# Patient Record
Sex: MALE | Race: WHITE | NOT HISPANIC OR LATINO | Employment: OTHER | URBAN - NONMETROPOLITAN AREA
[De-identification: names, ages, dates, MRNs, and addresses within clinical notes are randomized per-mention and may not be internally consistent; named-entity substitution may affect disease eponyms.]

---

## 2021-03-31 ENCOUNTER — OFFICE VISIT (OUTPATIENT)
Dept: FAMILY MEDICINE CLINIC | Facility: CLINIC | Age: 66
End: 2021-03-31

## 2021-03-31 VITALS
HEART RATE: 66 BPM | HEIGHT: 71 IN | RESPIRATION RATE: 18 BRPM | SYSTOLIC BLOOD PRESSURE: 128 MMHG | OXYGEN SATURATION: 97 % | DIASTOLIC BLOOD PRESSURE: 80 MMHG | TEMPERATURE: 98.5 F | WEIGHT: 208.4 LBS | BODY MASS INDEX: 29.18 KG/M2

## 2021-03-31 DIAGNOSIS — B00.9 HSV INFECTION: ICD-10-CM

## 2021-03-31 DIAGNOSIS — K21.9 GASTROESOPHAGEAL REFLUX DISEASE, UNSPECIFIED WHETHER ESOPHAGITIS PRESENT: ICD-10-CM

## 2021-03-31 DIAGNOSIS — Z76.89 ENCOUNTER TO ESTABLISH CARE: Primary | ICD-10-CM

## 2021-03-31 DIAGNOSIS — E78.2 MIXED HYPERLIPIDEMIA: ICD-10-CM

## 2021-03-31 DIAGNOSIS — I10 ESSENTIAL HYPERTENSION: ICD-10-CM

## 2021-03-31 DIAGNOSIS — Z12.11 COLON CANCER SCREENING: ICD-10-CM

## 2021-03-31 RX ORDER — ASPIRIN 81 MG/1
81 TABLET, CHEWABLE ORAL DAILY
COMMUNITY
End: 2021-12-15

## 2021-03-31 RX ORDER — OMEPRAZOLE 20 MG/1
20 CAPSULE, DELAYED RELEASE ORAL DAILY
Qty: 90 CAPSULE | Refills: 1 | Status: SHIPPED | OUTPATIENT
Start: 2021-03-31 | End: 2021-12-08 | Stop reason: SDUPTHER

## 2021-03-31 RX ORDER — UBIQUINOL 100 MG
CAPSULE ORAL
COMMUNITY
End: 2022-03-23

## 2021-03-31 RX ORDER — OMEPRAZOLE 10 MG/1
20 CAPSULE, DELAYED RELEASE ORAL DAILY
COMMUNITY
End: 2021-03-31 | Stop reason: SDUPTHER

## 2021-03-31 RX ORDER — UBIDECARENONE 10 MG
200 CAPSULE ORAL DAILY
COMMUNITY
End: 2022-03-23

## 2021-03-31 RX ORDER — VALACYCLOVIR HYDROCHLORIDE 1 G/1
1000 TABLET, FILM COATED ORAL 2 TIMES DAILY PRN
Qty: 30 TABLET | Refills: 2 | Status: SHIPPED | OUTPATIENT
Start: 2021-03-31 | End: 2022-03-31

## 2021-03-31 RX ORDER — MULTIVIT WITH IRON,MINERALS
LIQUID (ML) ORAL DAILY
COMMUNITY
End: 2022-03-23

## 2021-03-31 RX ORDER — BENAZEPRIL HYDROCHLORIDE 10 MG/1
10 TABLET ORAL DAILY
COMMUNITY
End: 2021-03-31 | Stop reason: SDUPTHER

## 2021-03-31 RX ORDER — VALACYCLOVIR HYDROCHLORIDE 1 G/1
1000 TABLET, FILM COATED ORAL 2 TIMES DAILY PRN
COMMUNITY
End: 2021-03-31 | Stop reason: SDUPTHER

## 2021-03-31 RX ORDER — BENAZEPRIL HYDROCHLORIDE 10 MG/1
10 TABLET ORAL DAILY
Qty: 90 TABLET | Refills: 1 | Status: SHIPPED | OUTPATIENT
Start: 2021-03-31

## 2021-03-31 NOTE — PROGRESS NOTES
Assessment/Plan:     Diagnoses and all orders for this visit:    Encounter to establish care    Mixed hyperlipidemia  -     Lipid panel; Future    Essential hypertension  -     benazepril (LOTENSIN) 10 mg tablet; Take 1 tablet (10 mg total) by mouth daily  -     CBC and differential; Future  -     Comprehensive metabolic panel; Future  -     TSH, 3rd generation with Free T4 reflex; Future    Gastroesophageal reflux disease, unspecified whether esophagitis present  -     omeprazole (PriLOSEC) 20 mg delayed release capsule; Take 1 capsule (20 mg total) by mouth daily    Colon cancer screening  -     Ambulatory referral to Gastroenterology; Future    HSV infection  -     valACYclovir (VALTREX) 1,000 mg tablet; Take 1 tablet (1,000 mg total) by mouth 2 (two) times a day as needed (HSV Flare) for up to 30 doses    Other orders  -     Discontinue: omeprazole (PriLOSEC) 10 mg delayed release capsule; Take 20 mg by mouth daily  -     Discontinue: benazepril (LOTENSIN) 10 mg tablet; Take 10 mg by mouth daily  -     Multiple Vitamins-Minerals (multivitamin with iron-minerals) liquid; Take by mouth daily  -     Barnard 550 MG CAPS; Take by mouth  -     aspirin 81 mg chewable tablet; Chew 81 mg daily  -     Glucosamine 750 MG TABS; Take by mouth  -     St Johns Wort 150 MG TABS; Take by mouth  -     Discontinue: valACYclovir (VALTREX) 1,000 mg tablet; Take 1,000 mg by mouth 2 (two) times a day as needed  -     patient supplied medication; MEDICAL MARIJUANA  -     Coenzyme Q10 10 MG capsule; Take 200 mg by mouth daily        - Continue current medications  - Routine labs ordered  - Referral provided to GI for colon cancer screening, last done 2011    Return in about 3 months (around 6/30/2021) for Next scheduled follow up  Subjective:        Patient ID: Zhang Carranza is a 63 y o  male      Chief Complaint   Patient presents with   Lucía Guerrero is a 63year old male with history of HTN, HLD, and GERD, presenting to establish care  HTN is currently managed with benazepril 10 mg daily  /80 today  Patient denies chest pain, palpitations, LE edema  HLD, not currently on medication  Last lipid panel from 2019 with total cholesterol 266, , trig 140  GERD well controlled with omeprazole 20 mg daily  Patient's last colonoscopy 2011  Reports a family history of colon cancer in an aunt and uncle  Today patient is requesting a refill for valtrex which he takes PRN for HSV flares  He reports good control with this medication  The following portions of the patient's history were reviewed and updated as appropriate: allergies, current medications, past family history, past medical history, past social history, past surgical history and problem list     Patient Active Problem List   Diagnosis    Essential hypertension    Mixed hyperlipidemia    Gastroesophageal reflux disease       Current Outpatient Medications   Medication Sig Dispense Refill    aspirin 81 mg chewable tablet Chew 81 mg daily      benazepril (LOTENSIN) 10 mg tablet Take 1 tablet (10 mg total) by mouth daily 90 tablet 1    Glucosamine 750 MG TABS Take by mouth      Prospect 550 MG CAPS Take by mouth      Multiple Vitamins-Minerals (multivitamin with iron-minerals) liquid Take by mouth daily      omeprazole (PriLOSEC) 20 mg delayed release capsule Take 1 capsule (20 mg total) by mouth daily 90 capsule 1    patient supplied medication MEDICAL MARIJUANA      St Johns Wort 150 MG TABS Take by mouth      valACYclovir (VALTREX) 1,000 mg tablet Take 1 tablet (1,000 mg total) by mouth 2 (two) times a day as needed (HSV Flare) for up to 30 doses 30 tablet 2    Coenzyme Q10 10 MG capsule Take 200 mg by mouth daily       No current facility-administered medications for this visit           Past Medical History:   Diagnosis Date    Arthritis     GERD (gastroesophageal reflux disease)     Hiatal hernia     Jungle fever 1986 SHAKES, VOMITING APPROX EVERY 6 WEEKS    Shingles         Past Surgical History:   Procedure Laterality Date    VASECTOMY      WISDOM TOOTH EXTRACTION Bilateral         Social History     Socioeconomic History    Marital status: /Civil Union     Spouse name: Not on file    Number of children: Not on file    Years of education: Not on file    Highest education level: Not on file   Occupational History    Not on file   Social Needs    Financial resource strain: Not on file    Food insecurity     Worry: Not on file     Inability: Not on file   [employer] needs     Medical: Not on file     Non-medical: Not on file   Tobacco Use    Smoking status: Never Smoker    Smokeless tobacco: Former User   Substance and Sexual Activity    Alcohol use: Not Currently     Comment: social    Drug use: Never     Comment: medical marijuana    Sexual activity: Not Currently   Lifestyle    Physical activity     Days per week: Not on file     Minutes per session: Not on file    Stress: Not on file   Relationships    Social connections     Talks on phone: Not on file     Gets together: Not on file     Attends Confucianist service: Not on file     Active member of club or organization: Not on file     Attends meetings of clubs or organizations: Not on file     Relationship status: Not on file    Intimate partner violence     Fear of current or ex partner: Not on file     Emotionally abused: Not on file     Physically abused: Not on file     Forced sexual activity: Not on file   Other Topics Concern    Not on file   Social History Narrative    Not on file        Review of Systems   Constitutional: Negative for chills, diaphoresis and fever  HENT: Negative for congestion, ear pain, postnasal drip, rhinorrhea, sinus pressure and sore throat  Eyes: Negative for photophobia, pain, discharge, redness, itching and visual disturbance     Respiratory: Negative for cough, chest tightness, shortness of breath and wheezing  Cardiovascular: Negative for chest pain, palpitations and leg swelling  Gastrointestinal: Negative for abdominal pain, blood in stool, constipation, diarrhea, nausea and vomiting  Skin: Negative for rash and wound  Neurological: Negative for dizziness, syncope, weakness, light-headedness and headaches  Psychiatric/Behavioral: Negative for decreased concentration, dysphoric mood, self-injury, sleep disturbance and suicidal ideas  The patient is not nervous/anxious  Objective:      /80 (BP Location: Right arm, Patient Position: Sitting, Cuff Size: Large)   Pulse 66   Temp 98 5 °F (36 9 °C) (Temporal)   Resp 18   Ht 5' 11" (1 803 m)   Wt 94 5 kg (208 lb 6 4 oz)   SpO2 97%   BMI 29 07 kg/m²          Physical Exam  Vitals signs and nursing note reviewed  Constitutional:       General: He is not in acute distress  Appearance: Normal appearance  HENT:      Head: Normocephalic and atraumatic  Eyes:      Extraocular Movements: Extraocular movements intact  Conjunctiva/sclera: Conjunctivae normal       Pupils: Pupils are equal, round, and reactive to light  Neck:      Musculoskeletal: Normal range of motion and neck supple  Cardiovascular:      Rate and Rhythm: Normal rate and regular rhythm  Heart sounds: Normal heart sounds  No murmur  Pulmonary:      Effort: Pulmonary effort is normal  No respiratory distress  Breath sounds: Normal breath sounds  No wheezing  Musculoskeletal: Normal range of motion  Right lower leg: No edema  Left lower leg: No edema  Skin:     General: Skin is warm and dry  Neurological:      General: No focal deficit present  Mental Status: He is alert and oriented to person, place, and time  Cranial Nerves: No cranial nerve deficit  Motor: No weakness     Psychiatric:         Mood and Affect: Mood normal          Behavior: Behavior normal

## 2021-09-15 ENCOUNTER — APPOINTMENT (OUTPATIENT)
Dept: LAB | Facility: HOSPITAL | Age: 66
End: 2021-09-15

## 2021-09-15 DIAGNOSIS — E78.2 MIXED HYPERLIPIDEMIA: ICD-10-CM

## 2021-09-15 DIAGNOSIS — I10 ESSENTIAL HYPERTENSION: ICD-10-CM

## 2021-09-15 LAB
ALBUMIN SERPL BCP-MCNC: 4 G/DL (ref 3.5–5)
ALP SERPL-CCNC: 79 U/L (ref 46–116)
ALT SERPL W P-5'-P-CCNC: 62 U/L (ref 12–78)
ANION GAP SERPL CALCULATED.3IONS-SCNC: 10 MMOL/L (ref 4–13)
AST SERPL W P-5'-P-CCNC: 34 U/L (ref 5–45)
BASOPHILS # BLD AUTO: 0.07 THOUSANDS/ΜL (ref 0–0.1)
BASOPHILS NFR BLD AUTO: 1 % (ref 0–1)
BILIRUB SERPL-MCNC: 0.57 MG/DL (ref 0.2–1)
BUN SERPL-MCNC: 17 MG/DL (ref 5–25)
CALCIUM SERPL-MCNC: 9.3 MG/DL (ref 8.3–10.1)
CHLORIDE SERPL-SCNC: 105 MMOL/L (ref 100–108)
CHOLEST SERPL-MCNC: 287 MG/DL (ref 50–200)
CO2 SERPL-SCNC: 27 MMOL/L (ref 21–32)
CREAT SERPL-MCNC: 1 MG/DL (ref 0.6–1.3)
EOSINOPHIL # BLD AUTO: 0.22 THOUSAND/ΜL (ref 0–0.61)
EOSINOPHIL NFR BLD AUTO: 4 % (ref 0–6)
ERYTHROCYTE [DISTWIDTH] IN BLOOD BY AUTOMATED COUNT: 12.1 % (ref 11.6–15.1)
GFR SERPL CREATININE-BSD FRML MDRD: 79 ML/MIN/1.73SQ M
GLUCOSE P FAST SERPL-MCNC: 128 MG/DL (ref 65–99)
HCT VFR BLD AUTO: 42.6 % (ref 36.5–49.3)
HDLC SERPL-MCNC: 40 MG/DL
HGB BLD-MCNC: 14.3 G/DL (ref 12–17)
IMM GRANULOCYTES # BLD AUTO: 0.01 THOUSAND/UL (ref 0–0.2)
IMM GRANULOCYTES NFR BLD AUTO: 0 % (ref 0–2)
LDLC SERPL CALC-MCNC: 204 MG/DL (ref 0–100)
LYMPHOCYTES # BLD AUTO: 2.52 THOUSANDS/ΜL (ref 0.6–4.47)
LYMPHOCYTES NFR BLD AUTO: 42 % (ref 14–44)
MCH RBC QN AUTO: 29.3 PG (ref 26.8–34.3)
MCHC RBC AUTO-ENTMCNC: 33.6 G/DL (ref 31.4–37.4)
MCV RBC AUTO: 87 FL (ref 82–98)
MONOCYTES # BLD AUTO: 0.46 THOUSAND/ΜL (ref 0.17–1.22)
MONOCYTES NFR BLD AUTO: 8 % (ref 4–12)
NEUTROPHILS # BLD AUTO: 2.77 THOUSANDS/ΜL (ref 1.85–7.62)
NEUTS SEG NFR BLD AUTO: 45 % (ref 43–75)
NONHDLC SERPL-MCNC: 247 MG/DL
NRBC BLD AUTO-RTO: 0 /100 WBCS
PLATELET # BLD AUTO: 291 THOUSANDS/UL (ref 149–390)
PMV BLD AUTO: 10.5 FL (ref 8.9–12.7)
POTASSIUM SERPL-SCNC: 4.3 MMOL/L (ref 3.5–5.3)
PROT SERPL-MCNC: 8 G/DL (ref 6.4–8.2)
RBC # BLD AUTO: 4.88 MILLION/UL (ref 3.88–5.62)
SODIUM SERPL-SCNC: 142 MMOL/L (ref 136–145)
TRIGL SERPL-MCNC: 213 MG/DL
TSH SERPL DL<=0.05 MIU/L-ACNC: 2.13 UIU/ML (ref 0.36–3.74)
WBC # BLD AUTO: 6.05 THOUSAND/UL (ref 4.31–10.16)

## 2021-09-15 PROCEDURE — 80053 COMPREHEN METABOLIC PANEL: CPT

## 2021-09-15 PROCEDURE — 36415 COLL VENOUS BLD VENIPUNCTURE: CPT

## 2021-09-15 PROCEDURE — 85025 COMPLETE CBC W/AUTO DIFF WBC: CPT

## 2021-09-15 PROCEDURE — 84443 ASSAY THYROID STIM HORMONE: CPT

## 2021-09-15 PROCEDURE — 80061 LIPID PANEL: CPT

## 2021-09-16 ENCOUNTER — TELEPHONE (OUTPATIENT)
Dept: FAMILY MEDICINE CLINIC | Facility: CLINIC | Age: 66
End: 2021-09-16

## 2021-09-16 NOTE — TELEPHONE ENCOUNTER
Spoke with patients wife  Patient is scheduled for 9/24,    ----- Message from Brandi Singh PA-C sent at 9/16/2021  8:14 AM EDT -----  Please schedule patient for visit to review blood work  Thanks!

## 2021-09-17 ENCOUNTER — OFFICE VISIT (OUTPATIENT)
Dept: FAMILY MEDICINE CLINIC | Facility: CLINIC | Age: 66
End: 2021-09-17
Payer: OTHER GOVERNMENT

## 2021-09-17 VITALS
HEIGHT: 71 IN | HEART RATE: 60 BPM | RESPIRATION RATE: 16 BRPM | BODY MASS INDEX: 30.52 KG/M2 | DIASTOLIC BLOOD PRESSURE: 76 MMHG | OXYGEN SATURATION: 98 % | TEMPERATURE: 97.9 F | SYSTOLIC BLOOD PRESSURE: 132 MMHG | WEIGHT: 218 LBS

## 2021-09-17 DIAGNOSIS — R73.9 ELEVATED BLOOD SUGAR LEVEL: ICD-10-CM

## 2021-09-17 DIAGNOSIS — E78.2 MIXED HYPERLIPIDEMIA: Primary | ICD-10-CM

## 2021-09-17 LAB — SL AMB POCT HEMOGLOBIN AIC: 5.8 (ref ?–6.5)

## 2021-09-17 PROCEDURE — 99213 OFFICE O/P EST LOW 20 MIN: CPT | Performed by: FAMILY MEDICINE

## 2021-09-17 PROCEDURE — 85018 HEMOGLOBIN: CPT | Performed by: FAMILY MEDICINE

## 2021-09-17 RX ORDER — ATORVASTATIN CALCIUM 40 MG/1
40 TABLET, FILM COATED ORAL DAILY
Qty: 30 TABLET | Refills: 1 | Status: SHIPPED | OUTPATIENT
Start: 2021-09-17

## 2021-09-17 NOTE — PROGRESS NOTES
Assessment/Plan:    No problem-specific Assessment & Plan notes found for this encounter  Diagnoses and all orders for this visit:    Mixed hyperlipidemia  -     atorvastatin (LIPITOR) 40 mg tablet; Take 1 tablet (40 mg total) by mouth daily  -     Hepatic function panel; Future    Elevated blood sugar level  -     POCT hemoglobin A1c    Other orders  -     Trolamine Salicylate (BLUE-EMU HEMP EX); Apply topically          -reviewed heart healthy diet with patient  will start high intensity statin therapy  Check LFTs 1 month  -RTC 3 months or sooner if concerns arise    Subjective:      Patient ID: Rain Medina is a 61 y o  male with a PMH of HTN, GERD, and HLD presents for follow up to review blood work  His FBS is mildly elevated at 128  He notes increased thirst and frequent urination, however, this has been present for 40+ years  He has no fatigue, nausea, vomiting, abdominal pain, changes in vision  His in office A1C is 5 8%    His LDL cholesterol is very elevated at 204  He is not on a cholesterol medication  He is concerned as his family has a history of esophageal cancer (brother) and colon cancer (uncle)  He is scheduled wit GI in November  His GERD is managed with prilosec  His BP is at goal on benazepril  Denies CP, changes in vision, headaches, dizziness  HPI    The following portions of the patient's history were reviewed and updated as appropriate: allergies, current medications, past family history, past medical history, past social history, past surgical history and problem list     Review of Systems   Constitutional: Negative for activity change and appetite change  HENT: Negative  Eyes: Negative for visual disturbance  Respiratory: Negative for cough, chest tightness, shortness of breath and wheezing  Cardiovascular: Negative for chest pain, palpitations and leg swelling     Gastrointestinal: Negative for abdominal distention, constipation, diarrhea, nausea and vomiting  Endocrine: Positive for polyuria  Negative for cold intolerance, heat intolerance, polydipsia and polyphagia  Neurological: Negative for dizziness and headaches  Objective:      /76   Pulse 60   Temp 97 9 °F (36 6 °C)   Resp 16   Ht 5' 11" (1 803 m)   Wt 98 9 kg (218 lb)   SpO2 98%   BMI 30 40 kg/m²          Physical Exam  Vitals reviewed  Constitutional:       General: He is not in acute distress  Appearance: Normal appearance  He is well-developed  He is obese  HENT:      Head: Normocephalic and atraumatic  Eyes:      Conjunctiva/sclera: Conjunctivae normal       Pupils: Pupils are equal, round, and reactive to light  Neck:      Thyroid: No thyromegaly  Cardiovascular:      Rate and Rhythm: Normal rate and regular rhythm  Pulses: Normal pulses  Heart sounds: Normal heart sounds  No murmur heard  Pulmonary:      Effort: Pulmonary effort is normal  No respiratory distress  Breath sounds: Normal breath sounds  No wheezing  Abdominal:      General: Bowel sounds are normal  There is no distension  Palpations: Abdomen is soft  There is no mass  Tenderness: There is no abdominal tenderness  Hernia: No hernia is present  Musculoskeletal:         General: Normal range of motion  Cervical back: Normal range of motion and neck supple  Right lower leg: No edema  Left lower leg: No edema  Skin:     General: Skin is warm and dry  Capillary Refill: Capillary refill takes less than 2 seconds  Neurological:      Mental Status: He is alert and oriented to person, place, and time  Mental status is at baseline  Psychiatric:         Mood and Affect: Mood normal          Behavior: Behavior normal          Thought Content: Thought content normal          Judgment: Judgment normal            BMI Counseling: Body mass index is 30 4 kg/m²   The BMI is above normal  Nutrition recommendations include reducing portion sizes, decreasing overall calorie intake, 3-5 servings of fruits/vegetables daily, reducing fast food intake, consuming healthier snacks, decreasing soda and/or juice intake, moderation in carbohydrate intake, increasing intake of lean protein, reducing intake of saturated fat and trans fat and reducing intake of cholesterol  Exercise recommendations include moderate aerobic physical activity for 150 minutes/week, joining a gym and strength training exercises

## 2021-09-17 NOTE — PATIENT INSTRUCTIONS

## 2021-11-19 ENCOUNTER — APPOINTMENT (OUTPATIENT)
Dept: LAB | Facility: MEDICAL CENTER | Age: 66
End: 2021-11-19
Payer: OTHER GOVERNMENT

## 2021-11-19 DIAGNOSIS — E78.2 MIXED HYPERLIPIDEMIA: ICD-10-CM

## 2021-11-19 LAB
ALBUMIN SERPL BCP-MCNC: 3.6 G/DL (ref 3.5–5)
ALP SERPL-CCNC: 70 U/L (ref 46–116)
ALT SERPL W P-5'-P-CCNC: 44 U/L (ref 12–78)
AST SERPL W P-5'-P-CCNC: 25 U/L (ref 5–45)
BILIRUB DIRECT SERPL-MCNC: 0.08 MG/DL (ref 0–0.2)
BILIRUB SERPL-MCNC: 0.43 MG/DL (ref 0.2–1)
PROT SERPL-MCNC: 7.2 G/DL (ref 6.4–8.2)

## 2021-11-19 PROCEDURE — 80076 HEPATIC FUNCTION PANEL: CPT

## 2021-11-19 PROCEDURE — 36415 COLL VENOUS BLD VENIPUNCTURE: CPT

## 2021-11-22 ENCOUNTER — TELEPHONE (OUTPATIENT)
Dept: FAMILY MEDICINE CLINIC | Facility: CLINIC | Age: 66
End: 2021-11-22

## 2021-11-29 ENCOUNTER — CONSULT (OUTPATIENT)
Dept: GASTROENTEROLOGY | Facility: CLINIC | Age: 66
End: 2021-11-29
Payer: OTHER GOVERNMENT

## 2021-11-29 VITALS
WEIGHT: 219.2 LBS | TEMPERATURE: 98.5 F | HEIGHT: 71 IN | SYSTOLIC BLOOD PRESSURE: 149 MMHG | HEART RATE: 57 BPM | BODY MASS INDEX: 30.69 KG/M2 | DIASTOLIC BLOOD PRESSURE: 76 MMHG

## 2021-11-29 DIAGNOSIS — Z12.11 COLON CANCER SCREENING: ICD-10-CM

## 2021-11-29 DIAGNOSIS — K21.9 GASTROESOPHAGEAL REFLUX DISEASE WITHOUT ESOPHAGITIS: Primary | ICD-10-CM

## 2021-11-29 PROCEDURE — 99244 OFF/OP CNSLTJ NEW/EST MOD 40: CPT | Performed by: INTERNAL MEDICINE

## 2021-12-06 ENCOUNTER — OFFICE VISIT (OUTPATIENT)
Dept: URGENT CARE | Facility: CLINIC | Age: 66
End: 2021-12-06
Payer: OTHER GOVERNMENT

## 2021-12-06 VITALS
OXYGEN SATURATION: 97 % | DIASTOLIC BLOOD PRESSURE: 83 MMHG | BODY MASS INDEX: 30.1 KG/M2 | SYSTOLIC BLOOD PRESSURE: 168 MMHG | HEIGHT: 71 IN | HEART RATE: 68 BPM | WEIGHT: 215 LBS | TEMPERATURE: 98.2 F

## 2021-12-06 DIAGNOSIS — R50.9 FEVER, UNSPECIFIED FEVER CAUSE: ICD-10-CM

## 2021-12-06 DIAGNOSIS — Z11.59 SCREENING FOR VIRAL DISEASE: ICD-10-CM

## 2021-12-06 DIAGNOSIS — J02.9 ACUTE PHARYNGITIS, UNSPECIFIED ETIOLOGY: Primary | ICD-10-CM

## 2021-12-06 LAB
FLUAV RNA RESP QL NAA+PROBE: NEGATIVE
FLUBV RNA RESP QL NAA+PROBE: NEGATIVE
RSV RNA RESP QL NAA+PROBE: NEGATIVE
SARS-COV-2 RNA RESP QL NAA+PROBE: NEGATIVE

## 2021-12-06 PROCEDURE — 99202 OFFICE O/P NEW SF 15 MIN: CPT | Performed by: PHYSICIAN ASSISTANT

## 2021-12-06 PROCEDURE — 0241U HB NFCT DS VIR RESP RNA 4 TRGT: CPT | Performed by: PHYSICIAN ASSISTANT

## 2021-12-06 RX ORDER — AMOXICILLIN 875 MG/1
875 TABLET, COATED ORAL 2 TIMES DAILY
Qty: 20 TABLET | Refills: 0
Start: 2021-12-06 | End: 2021-12-16

## 2021-12-08 DIAGNOSIS — K21.9 GASTROESOPHAGEAL REFLUX DISEASE, UNSPECIFIED WHETHER ESOPHAGITIS PRESENT: ICD-10-CM

## 2021-12-08 RX ORDER — OMEPRAZOLE 20 MG/1
20 CAPSULE, DELAYED RELEASE ORAL DAILY
Qty: 90 CAPSULE | Refills: 1 | Status: SHIPPED | OUTPATIENT
Start: 2021-12-08 | End: 2022-06-24 | Stop reason: SDUPTHER

## 2021-12-15 ENCOUNTER — OFFICE VISIT (OUTPATIENT)
Dept: FAMILY MEDICINE CLINIC | Facility: CLINIC | Age: 66
End: 2021-12-15
Payer: OTHER GOVERNMENT

## 2021-12-15 VITALS
SYSTOLIC BLOOD PRESSURE: 152 MMHG | WEIGHT: 217 LBS | DIASTOLIC BLOOD PRESSURE: 82 MMHG | TEMPERATURE: 97.9 F | OXYGEN SATURATION: 97 % | RESPIRATION RATE: 18 BRPM | HEART RATE: 66 BPM | BODY MASS INDEX: 30.38 KG/M2 | HEIGHT: 71 IN

## 2021-12-15 DIAGNOSIS — H93.8X2 EAR PRESSURE, LEFT: ICD-10-CM

## 2021-12-15 DIAGNOSIS — I10 ESSENTIAL HYPERTENSION: Primary | ICD-10-CM

## 2021-12-15 DIAGNOSIS — H61.22 IMPACTED CERUMEN OF LEFT EAR: ICD-10-CM

## 2021-12-15 DIAGNOSIS — Z11.4 SCREENING FOR HIV WITHOUT PRESENCE OF RISK FACTORS: ICD-10-CM

## 2021-12-15 DIAGNOSIS — E78.2 MIXED HYPERLIPIDEMIA: ICD-10-CM

## 2021-12-15 DIAGNOSIS — Z11.59 NEED FOR HEPATITIS C SCREENING TEST: ICD-10-CM

## 2021-12-15 PROCEDURE — 99213 OFFICE O/P EST LOW 20 MIN: CPT | Performed by: FAMILY MEDICINE

## 2021-12-15 RX ORDER — PREDNISONE 20 MG/1
40 TABLET ORAL DAILY
Qty: 10 TABLET | Refills: 0 | Status: SHIPPED | OUTPATIENT
Start: 2021-12-15 | End: 2021-12-20

## 2022-03-01 ENCOUNTER — TELEPHONE (OUTPATIENT)
Dept: GASTROENTEROLOGY | Facility: CLINIC | Age: 67
End: 2022-03-01

## 2022-03-23 ENCOUNTER — OFFICE VISIT (OUTPATIENT)
Dept: FAMILY MEDICINE CLINIC | Facility: CLINIC | Age: 67
End: 2022-03-23
Payer: OTHER GOVERNMENT

## 2022-03-23 VITALS
SYSTOLIC BLOOD PRESSURE: 142 MMHG | HEART RATE: 55 BPM | TEMPERATURE: 98.9 F | OXYGEN SATURATION: 98 % | RESPIRATION RATE: 18 BRPM | HEIGHT: 71 IN | DIASTOLIC BLOOD PRESSURE: 80 MMHG | BODY MASS INDEX: 29.96 KG/M2 | WEIGHT: 214 LBS

## 2022-03-23 DIAGNOSIS — Z23 NEED FOR TDAP VACCINATION: ICD-10-CM

## 2022-03-23 DIAGNOSIS — Z11.59 NEED FOR HEPATITIS C SCREENING TEST: ICD-10-CM

## 2022-03-23 DIAGNOSIS — Z11.4 SCREENING FOR HIV WITHOUT PRESENCE OF RISK FACTORS: ICD-10-CM

## 2022-03-23 DIAGNOSIS — I10 ESSENTIAL HYPERTENSION: ICD-10-CM

## 2022-03-23 DIAGNOSIS — E78.2 MIXED HYPERLIPIDEMIA: Primary | ICD-10-CM

## 2022-03-23 PROCEDURE — 99213 OFFICE O/P EST LOW 20 MIN: CPT | Performed by: FAMILY MEDICINE

## 2022-03-23 NOTE — PROGRESS NOTES
Assessment/Plan:    No problem-specific Assessment & Plan notes found for this encounter  Diagnoses and all orders for this visit:    Mixed hyperlipidemia  Comments:  labs as ordered  we will call with results and further recommendations   Orders:  -     Lipid panel; Future    Screening for HIV without presence of risk factors  -     HIV 1/2 Antigen/Antibody (4th Generation) w Reflex [facility]; Future    Need for hepatitis C screening test  -     Hepatitis C antibody; Future    Essential hypertension  Comments:  continue current    Need for Tdap vaccination  Comments:  advised to obtain at local pharmacy           RTC 6 months or sooner if concerns arise    Subjective:      Patient ID: Keyana Champion is a 61 y o  male PMH GERD, HTN, HLD presents for routine follow up  He has been keeping a BP log at home with readings in the 120-140s consistently  Compliant with his lotensin 10 mg  Denies CP, SOB, dizziness, headaches, syncope  He is due for lipid screening  Colonoscopy/EGD schedued in May with GI  Using prilosec  HPI    The following portions of the patient's history were reviewed and updated as appropriate: allergies, current medications, past family history, past medical history, past social history, past surgical history and problem list     Review of Systems   Constitutional: Negative  HENT: Negative  Respiratory: Negative  Cardiovascular: Negative  Gastrointestinal: Negative  Musculoskeletal: Negative  Neurological: Negative  Objective:      /80 (BP Location: Left arm, Patient Position: Sitting)   Pulse 55   Temp 98 9 °F (37 2 °C) (Temporal)   Resp 18   Ht 5' 11" (1 803 m)   Wt 97 1 kg (214 lb)   SpO2 98%   BMI 29 85 kg/m²          Physical Exam  Vitals reviewed  Constitutional:       General: He is not in acute distress  Appearance: He is well-developed  HENT:      Head: Normocephalic and atraumatic     Eyes:      Conjunctiva/sclera: Conjunctivae normal       Pupils: Pupils are equal, round, and reactive to light  Neck:      Thyroid: No thyromegaly  Cardiovascular:      Rate and Rhythm: Normal rate and regular rhythm  Pulses: Normal pulses  Heart sounds: Normal heart sounds  No murmur heard  Pulmonary:      Effort: Pulmonary effort is normal  No respiratory distress  Breath sounds: Normal breath sounds  No stridor  No wheezing or rhonchi  Abdominal:      General: Bowel sounds are normal  There is no distension  Palpations: Abdomen is soft  There is no mass  Tenderness: There is no abdominal tenderness  Hernia: No hernia is present  Musculoskeletal:         General: Normal range of motion  Cervical back: Normal range of motion and neck supple  Right lower leg: No edema  Left lower leg: No edema  Skin:     General: Skin is warm and dry  Neurological:      Mental Status: He is alert and oriented to person, place, and time  Mental status is at baseline  Psychiatric:         Mood and Affect: Mood normal          Behavior: Behavior normal          Thought Content:  Thought content normal          Judgment: Judgment normal

## 2022-05-06 NOTE — TELEPHONE ENCOUNTER
Called pt and informed him that miralax/dulcolax can be purchased OTC  E-mail sent with bowel prep instructions

## 2022-05-06 NOTE — TELEPHONE ENCOUNTER
Pt's wife called bc the he went down to the pharmacy to  his prep for 5/13 and it was not there She just wanted to make sure that it gets there in time   It is the [facility]

## 2022-05-12 ENCOUNTER — ANESTHESIA EVENT (OUTPATIENT)
Dept: ANESTHESIOLOGY | Facility: HOSPITAL | Age: 67
End: 2022-05-12

## 2022-05-12 ENCOUNTER — ANESTHESIA (OUTPATIENT)
Dept: ANESTHESIOLOGY | Facility: HOSPITAL | Age: 67
End: 2022-05-12

## 2022-05-12 NOTE — ANESTHESIA PREPROCEDURE EVALUATION
Procedure:  PRE-OP ONLY    EGD for GERD and colonoscopy for CRC screening         Relevant Problems   CARDIO   (+) Essential hypertension   (+) Mixed hyperlipidemia      GI/HEPATIC   (+) Gastroesophageal reflux disease             Anesthesia Plan  ASA Score- 2     Anesthesia Type- IV sedation with anesthesia with ASA Monitors  Additional Monitors:   Airway Plan:           Plan Factors-    Chart reviewed  Existing labs reviewed  Patient summary reviewed              Induction-     Postoperative Plan-     Informed Consent-

## 2022-05-13 ENCOUNTER — HOSPITAL ENCOUNTER (OUTPATIENT)
Dept: PERIOP | Facility: HOSPITAL | Age: 67
Setting detail: OUTPATIENT SURGERY
Discharge: HOME | End: 2022-05-13
Attending: INTERNAL MEDICINE | Admitting: INTERNAL MEDICINE
Payer: MEDICARE

## 2022-05-13 ENCOUNTER — ANESTHESIA EVENT (OUTPATIENT)
Dept: PERIOP | Facility: HOSPITAL | Age: 67
End: 2022-05-13

## 2022-05-13 ENCOUNTER — ANESTHESIA (OUTPATIENT)
Dept: PERIOP | Facility: HOSPITAL | Age: 67
End: 2022-05-13

## 2022-05-13 VITALS
RESPIRATION RATE: 16 BRPM | WEIGHT: 214.07 LBS | DIASTOLIC BLOOD PRESSURE: 77 MMHG | HEART RATE: 49 BPM | BODY MASS INDEX: 29.97 KG/M2 | HEIGHT: 71 IN | TEMPERATURE: 97.7 F | OXYGEN SATURATION: 98 % | SYSTOLIC BLOOD PRESSURE: 166 MMHG

## 2022-05-13 DIAGNOSIS — K21.9 GASTROESOPHAGEAL REFLUX DISEASE WITHOUT ESOPHAGITIS: ICD-10-CM

## 2022-05-13 DIAGNOSIS — Z12.11 COLON CANCER SCREENING: ICD-10-CM

## 2022-05-13 PROCEDURE — 88305 TISSUE EXAM BY PATHOLOGIST: CPT | Performed by: PATHOLOGY

## 2022-05-13 PROCEDURE — G0121 COLON CA SCRN NOT HI RSK IND: HCPCS | Performed by: INTERNAL MEDICINE

## 2022-05-13 PROCEDURE — 43239 EGD BIOPSY SINGLE/MULTIPLE: CPT | Performed by: INTERNAL MEDICINE

## 2022-05-13 RX ORDER — ONDANSETRON 2 MG/ML
4 INJECTION INTRAMUSCULAR; INTRAVENOUS ONCE AS NEEDED
Status: DISCONTINUED | OUTPATIENT
Start: 2022-05-13 | End: 2022-05-17 | Stop reason: HOSPADM

## 2022-05-13 RX ORDER — PROPOFOL 10 MG/ML
INJECTION, EMULSION INTRAVENOUS AS NEEDED
Status: DISCONTINUED | OUTPATIENT
Start: 2022-05-13 | End: 2022-05-13

## 2022-05-13 RX ORDER — FENTANYL CITRATE/PF 50 MCG/ML
25 SYRINGE (ML) INJECTION
Status: DISCONTINUED | OUTPATIENT
Start: 2022-05-13 | End: 2022-05-17 | Stop reason: HOSPADM

## 2022-05-13 RX ORDER — LIDOCAINE HYDROCHLORIDE 20 MG/ML
INJECTION, SOLUTION EPIDURAL; INFILTRATION; INTRACAUDAL; PERINEURAL AS NEEDED
Status: DISCONTINUED | OUTPATIENT
Start: 2022-05-13 | End: 2022-05-13

## 2022-05-13 RX ORDER — ALBUTEROL SULFATE 2.5 MG/3ML
2.5 SOLUTION RESPIRATORY (INHALATION) ONCE AS NEEDED
Status: DISCONTINUED | OUTPATIENT
Start: 2022-05-13 | End: 2022-05-17 | Stop reason: HOSPADM

## 2022-05-13 RX ORDER — PROPOFOL 10 MG/ML
INJECTION, EMULSION INTRAVENOUS CONTINUOUS PRN
Status: DISCONTINUED | OUTPATIENT
Start: 2022-05-13 | End: 2022-05-13

## 2022-05-13 RX ORDER — SODIUM CHLORIDE, SODIUM LACTATE, POTASSIUM CHLORIDE, CALCIUM CHLORIDE 600; 310; 30; 20 MG/100ML; MG/100ML; MG/100ML; MG/100ML
INJECTION, SOLUTION INTRAVENOUS CONTINUOUS PRN
Status: DISCONTINUED | OUTPATIENT
Start: 2022-05-13 | End: 2022-05-13

## 2022-05-13 RX ADMIN — PROPOFOL 120 MG: 10 INJECTION, EMULSION INTRAVENOUS at 08:02

## 2022-05-13 RX ADMIN — SODIUM CHLORIDE, SODIUM LACTATE, POTASSIUM CHLORIDE, AND CALCIUM CHLORIDE: .6; .31; .03; .02 INJECTION, SOLUTION INTRAVENOUS at 07:59

## 2022-05-13 RX ADMIN — LIDOCAINE HYDROCHLORIDE 100 MG: 20 INJECTION, SOLUTION EPIDURAL; INFILTRATION; INTRACAUDAL at 08:02

## 2022-05-13 RX ADMIN — PROPOFOL 150 MCG/KG/MIN: 10 INJECTION, EMULSION INTRAVENOUS at 08:02

## 2022-05-13 NOTE — ANESTHESIA POSTPROCEDURE EVALUATION
Post-Op Assessment Note    CV Status:  Stable  Pain Score: 0    Pain management: adequate     Mental Status:  Alert and awake   Hydration Status:  Euvolemic   PONV Controlled:  Controlled   Airway Patency:  Patent      Post Op Vitals Reviewed: Yes      Staff: CRNA         No complications documented      BP   110/61   Temp      Pulse 53   Resp   18   SpO2   100

## 2022-05-13 NOTE — H&P
History and Physical -  [facility]  Charlotte Banda 74 y o  male MRN: [id number]                  HPI: Charlotte Banda is a 74y o  year old male who presents for reflux symptoms and colon cancer screening      REVIEW OF SYSTEMS: Per the HPI, and otherwise unremarkable  Historical Information   Past Medical History:   Diagnosis Date    Arthritis     GERD (gastroesophageal reflux disease)     Hiatal hernia     Jungle fever 1986    SHAKES, VOMITING APPROX EVERY 6 WEEKS    Shingles      Past Surgical History:   Procedure Laterality Date    VASECTOMY      WISDOM TOOTH EXTRACTION Bilateral      Social History   Social History     Substance and Sexual Activity   Alcohol Use Yes    Comment: social     Social History     Substance and Sexual Activity   Drug Use Never    Comment: medical marijuana     Social History     Tobacco Use   Smoking Status Never Smoker   Smokeless Tobacco Former User     Family History   Problem Relation Age of Onset   Sanders Dementia Mother     Parkinsonism Mother     Hepatitis Father     Cancer Brother     Dementia Maternal Grandmother     Stroke Maternal Grandmother     Cancer Paternal Aunt     Cancer Paternal Uncle        Meds/Allergies       Current Outpatient Medications:     benazepril (LOTENSIN) 10 mg tablet    omeprazole (PriLOSEC) 20 mg delayed release capsule    atorvastatin (LIPITOR) 40 mg tablet    patient supplied medication    valACYclovir (VALTREX) 1,000 mg tablet    No Known Allergies    Objective     There were no vitals taken for this visit  PHYSICAL EXAM    Gen: NAD  Head: NCAT  CV: RRR  CHEST: Clear  ABD: soft, NT/ND  EXT: no edema      ASSESSMENT/PLAN:  This is a 74y o  year old male here for colonoscopy and endoscopy, and he is stable and optimized for his procedure

## 2022-05-13 NOTE — ANESTHESIA PREPROCEDURE EVALUATION
Procedure:  COLONOSCOPY  EGD  EGD for GERD and colonoscopy for CRC screening     Denies the following: CP/SOB with exertion, asthma, COPD, ALETA, stroke/TIA, seizure    Relevant Problems   CARDIO   (+) Essential hypertension   (+) Mixed hyperlipidemia      GI/HEPATIC   (+) Gastroesophageal reflux disease        Physical Exam    Airway    Mallampati score: II  TM Distance: >3 FB  Neck ROM: full     Dental       Cardiovascular      Pulmonary      Other Findings        Anesthesia Plan  ASA Score- 2     Anesthesia Type- IV sedation with anesthesia with ASA Monitors  Additional Monitors:   Airway Plan:           Plan Factors-Exercise tolerance (METS): >4 METS  Chart reviewed  Patient summary reviewed  Patient is not a current smoker  Obstructive sleep apnea risk education given perioperatively  Induction-     Postoperative Plan-     Informed Consent- Anesthetic plan and risks discussed with patient  I personally reviewed this patient with the CRNA  Discussed and agreed on the Anesthesia Plan with the CRNA  Rober Eason

## 2022-06-24 DIAGNOSIS — K21.9 GASTROESOPHAGEAL REFLUX DISEASE, UNSPECIFIED WHETHER ESOPHAGITIS PRESENT: ICD-10-CM

## 2022-06-24 RX ORDER — OMEPRAZOLE 20 MG/1
20 CAPSULE, DELAYED RELEASE ORAL DAILY
Qty: 90 CAPSULE | Refills: 0 | Status: SHIPPED | OUTPATIENT
Start: 2022-06-24

## 2022-12-13 ENCOUNTER — APPOINTMENT (OUTPATIENT)
Dept: LAB | Facility: CLINIC | Age: 67
End: 2022-12-13

## 2022-12-13 DIAGNOSIS — E78.2 MIXED HYPERLIPIDEMIA: ICD-10-CM

## 2022-12-13 DIAGNOSIS — Z11.59 NEED FOR HEPATITIS C SCREENING TEST: ICD-10-CM

## 2022-12-13 DIAGNOSIS — Z11.4 SCREENING FOR HIV WITHOUT PRESENCE OF RISK FACTORS: ICD-10-CM

## 2022-12-13 LAB
CHOLEST SERPL-MCNC: 262 MG/DL
HDLC SERPL-MCNC: 53 MG/DL
LDLC SERPL CALC-MCNC: 188 MG/DL (ref 0–100)
NONHDLC SERPL-MCNC: 209 MG/DL
TRIGL SERPL-MCNC: 106 MG/DL

## 2023-05-15 ENCOUNTER — OFFICE VISIT (OUTPATIENT)
Dept: FAMILY MEDICINE CLINIC | Facility: CLINIC | Age: 68
End: 2023-05-15

## 2023-05-15 VITALS
OXYGEN SATURATION: 97 % | SYSTOLIC BLOOD PRESSURE: 142 MMHG | BODY MASS INDEX: 28.56 KG/M2 | HEIGHT: 71 IN | WEIGHT: 204 LBS | DIASTOLIC BLOOD PRESSURE: 82 MMHG | HEART RATE: 68 BPM

## 2023-05-15 DIAGNOSIS — I10 ESSENTIAL HYPERTENSION: ICD-10-CM

## 2023-05-15 DIAGNOSIS — E78.2 MIXED HYPERLIPIDEMIA: Primary | ICD-10-CM

## 2023-05-15 RX ORDER — BENAZEPRIL HYDROCHLORIDE 10 MG/1
10 TABLET ORAL DAILY
Qty: 90 TABLET | Refills: 1 | Status: SHIPPED | OUTPATIENT
Start: 2023-05-15

## 2023-05-15 NOTE — PROGRESS NOTES
"Assessment/Plan:    No problem-specific Assessment & Plan notes found for this encounter  Diagnoses and all orders for this visit:    Mixed hyperlipidemia  -     Lipid panel; Future    Essential hypertension  -     CBC and differential; Future  -     Comprehensive metabolic panel; Future  -     TSH, 3rd generation with Free T4 reflex; Future  -     Albumin / creatinine urine ratio; Future  -     benazepril (LOTENSIN) 10 mg tablet; Take 1 tablet (10 mg total) by mouth daily          -cont current  -labs as ordered  -RTC 6 months or sooner if concerns arise    Subjective:      Patient ID: Jair Lagunas is a 79 y o  male presents for follow up  BP is 142/82  States he needs a refill on lotensis  He has no acute complaints today  Notes having bronchitis in April 2023 and still has slightly lingering cough but is otherwise asymptomatic  Due for labwork     HPI    The following portions of the patient's history were reviewed and updated as appropriate: allergies, current medications, past family history, past medical history, past social history, past surgical history and problem list     Review of Systems   Constitutional: Negative  HENT: Negative  Respiratory: Positive for cough  Negative for chest tightness, shortness of breath and wheezing  Cardiovascular: Negative  Gastrointestinal: Negative  Musculoskeletal: Negative  Skin: Negative  Neurological: Negative  Objective:      /82   Pulse 68   Ht 5' 11\" (1 803 m)   Wt 92 5 kg (204 lb) Comment: per last visit, pt declined  SpO2 97%   BMI 28 45 kg/m²          Physical Exam  Vitals reviewed  Constitutional:       General: He is not in acute distress  Appearance: Normal appearance  He is well-developed and normal weight  HENT:      Head: Normocephalic and atraumatic  Eyes:      Conjunctiva/sclera: Conjunctivae normal       Pupils: Pupils are equal, round, and reactive to light     Neck:      Thyroid: No " thyromegaly  Cardiovascular:      Rate and Rhythm: Normal rate and regular rhythm  Pulses: Normal pulses  Heart sounds: Normal heart sounds  No murmur heard  Pulmonary:      Effort: Pulmonary effort is normal  No respiratory distress  Breath sounds: Normal breath sounds  No stridor  No wheezing or rhonchi  Abdominal:      General: Bowel sounds are normal  There is no distension  Palpations: Abdomen is soft  Tenderness: There is no abdominal tenderness  Musculoskeletal:         General: Normal range of motion  Cervical back: Normal range of motion and neck supple  Skin:     General: Skin is warm and dry  Capillary Refill: Capillary refill takes less than 2 seconds  Neurological:      Mental Status: He is alert and oriented to person, place, and time  Mental status is at baseline  Psychiatric:         Mood and Affect: Mood normal          Behavior: Behavior normal          Thought Content:  Thought content normal          Judgment: Judgment normal

## 2023-06-30 ENCOUNTER — APPOINTMENT (OUTPATIENT)
Dept: LAB | Facility: CLINIC | Age: 68
End: 2023-06-30
Payer: MEDICARE

## 2023-06-30 DIAGNOSIS — E78.2 MIXED HYPERLIPIDEMIA: ICD-10-CM

## 2023-06-30 DIAGNOSIS — I10 ESSENTIAL HYPERTENSION: ICD-10-CM

## 2023-06-30 LAB
ALBUMIN SERPL BCP-MCNC: 3.7 G/DL (ref 3.5–5)
ALP SERPL-CCNC: 63 U/L (ref 46–116)
ALT SERPL W P-5'-P-CCNC: 29 U/L (ref 12–78)
ANION GAP SERPL CALCULATED.3IONS-SCNC: 6 MMOL/L
AST SERPL W P-5'-P-CCNC: 24 U/L (ref 5–45)
BASOPHILS # BLD AUTO: 0.06 THOUSANDS/ÂΜL (ref 0–0.1)
BASOPHILS NFR BLD AUTO: 1 % (ref 0–1)
BILIRUB SERPL-MCNC: 0.63 MG/DL (ref 0.2–1)
BUN SERPL-MCNC: 16 MG/DL (ref 5–25)
CALCIUM SERPL-MCNC: 9.1 MG/DL (ref 8.3–10.1)
CHLORIDE SERPL-SCNC: 110 MMOL/L (ref 96–108)
CHOLEST SERPL-MCNC: 261 MG/DL
CO2 SERPL-SCNC: 25 MMOL/L (ref 21–32)
CREAT SERPL-MCNC: 1 MG/DL (ref 0.6–1.3)
CREAT UR-MCNC: 201 MG/DL
EOSINOPHIL # BLD AUTO: 0.18 THOUSAND/ÂΜL (ref 0–0.61)
EOSINOPHIL NFR BLD AUTO: 4 % (ref 0–6)
ERYTHROCYTE [DISTWIDTH] IN BLOOD BY AUTOMATED COUNT: 12.4 % (ref 11.6–15.1)
GFR SERPL CREATININE-BSD FRML MDRD: 78 ML/MIN/1.73SQ M
GLUCOSE P FAST SERPL-MCNC: 105 MG/DL (ref 65–99)
HCT VFR BLD AUTO: 41.8 % (ref 36.5–49.3)
HDLC SERPL-MCNC: 53 MG/DL
HGB BLD-MCNC: 13.3 G/DL (ref 12–17)
IMM GRANULOCYTES # BLD AUTO: 0.01 THOUSAND/UL (ref 0–0.2)
IMM GRANULOCYTES NFR BLD AUTO: 0 % (ref 0–2)
LDLC SERPL CALC-MCNC: 196 MG/DL (ref 0–100)
LYMPHOCYTES # BLD AUTO: 2.04 THOUSANDS/ÂΜL (ref 0.6–4.47)
LYMPHOCYTES NFR BLD AUTO: 44 % (ref 14–44)
MCH RBC QN AUTO: 29.2 PG (ref 26.8–34.3)
MCHC RBC AUTO-ENTMCNC: 31.8 G/DL (ref 31.4–37.4)
MCV RBC AUTO: 92 FL (ref 82–98)
MICROALBUMIN UR-MCNC: 8.5 MG/L (ref 0–20)
MICROALBUMIN/CREAT 24H UR: 4 MG/G CREATININE (ref 0–30)
MONOCYTES # BLD AUTO: 0.38 THOUSAND/ÂΜL (ref 0.17–1.22)
MONOCYTES NFR BLD AUTO: 8 % (ref 4–12)
NEUTROPHILS # BLD AUTO: 2.01 THOUSANDS/ÂΜL (ref 1.85–7.62)
NEUTS SEG NFR BLD AUTO: 43 % (ref 43–75)
NONHDLC SERPL-MCNC: 208 MG/DL
NRBC BLD AUTO-RTO: 0 /100 WBCS
PLATELET # BLD AUTO: 275 THOUSANDS/UL (ref 149–390)
PMV BLD AUTO: 11.5 FL (ref 8.9–12.7)
POTASSIUM SERPL-SCNC: 4.3 MMOL/L (ref 3.5–5.3)
PROT SERPL-MCNC: 7.1 G/DL (ref 6.4–8.4)
RBC # BLD AUTO: 4.56 MILLION/UL (ref 3.88–5.62)
SODIUM SERPL-SCNC: 141 MMOL/L (ref 135–147)
TRIGL SERPL-MCNC: 61 MG/DL
TSH SERPL DL<=0.05 MIU/L-ACNC: 2.19 UIU/ML (ref 0.45–4.5)
WBC # BLD AUTO: 4.68 THOUSAND/UL (ref 4.31–10.16)

## 2023-06-30 PROCEDURE — 80061 LIPID PANEL: CPT

## 2023-06-30 PROCEDURE — 36415 COLL VENOUS BLD VENIPUNCTURE: CPT

## 2023-06-30 PROCEDURE — 85025 COMPLETE CBC W/AUTO DIFF WBC: CPT

## 2023-06-30 PROCEDURE — 84443 ASSAY THYROID STIM HORMONE: CPT

## 2023-06-30 PROCEDURE — 80053 COMPREHEN METABOLIC PANEL: CPT

## 2023-06-30 PROCEDURE — 82043 UR ALBUMIN QUANTITATIVE: CPT

## 2023-06-30 PROCEDURE — 82570 ASSAY OF URINE CREATININE: CPT

## 2023-08-07 ENCOUNTER — HOSPITAL ENCOUNTER (EMERGENCY)
Facility: HOSPITAL | Age: 68
Discharge: HOME | End: 2023-08-07
Attending: EMERGENCY MEDICINE | Admitting: EMERGENCY MEDICINE
Payer: MEDICARE

## 2023-08-07 ENCOUNTER — OFFICE VISIT (OUTPATIENT)
Dept: URGENT CARE | Facility: CLINIC | Age: 68
End: 2023-08-07
Payer: MEDICARE

## 2023-08-07 ENCOUNTER — APPOINTMENT (EMERGENCY)
Dept: RADIOLOGY | Facility: HOSPITAL | Age: 68
End: 2023-08-07
Payer: MEDICARE

## 2023-08-07 VITALS
WEIGHT: 201 LBS | DIASTOLIC BLOOD PRESSURE: 77 MMHG | HEART RATE: 72 BPM | BODY MASS INDEX: 28.14 KG/M2 | HEIGHT: 71 IN | OXYGEN SATURATION: 99 % | SYSTOLIC BLOOD PRESSURE: 167 MMHG | TEMPERATURE: 97.8 F | RESPIRATION RATE: 18 BRPM

## 2023-08-07 VITALS
TEMPERATURE: 97.6 F | HEIGHT: 71 IN | SYSTOLIC BLOOD PRESSURE: 183 MMHG | WEIGHT: 200 LBS | OXYGEN SATURATION: 98 % | RESPIRATION RATE: 17 BRPM | DIASTOLIC BLOOD PRESSURE: 87 MMHG | BODY MASS INDEX: 28 KG/M2 | HEART RATE: 63 BPM

## 2023-08-07 DIAGNOSIS — S62.502A: Primary | ICD-10-CM

## 2023-08-07 DIAGNOSIS — S67.02XA CRUSHING INJURY OF LEFT THUMB, INITIAL ENCOUNTER: Primary | ICD-10-CM

## 2023-08-07 LAB
ALBUMIN SERPL BCP-MCNC: 4.3 G/DL (ref 3.5–5)
ALP SERPL-CCNC: 64 U/L (ref 34–104)
ALT SERPL W P-5'-P-CCNC: 18 U/L (ref 7–52)
ANION GAP SERPL CALCULATED.3IONS-SCNC: 8 MMOL/L
APTT PPP: 27 SECONDS (ref 23–37)
AST SERPL W P-5'-P-CCNC: 19 U/L (ref 13–39)
BASOPHILS # BLD AUTO: 0.06 THOUSANDS/ÂΜL (ref 0–0.1)
BASOPHILS NFR BLD AUTO: 1 % (ref 0–1)
BILIRUB SERPL-MCNC: 0.44 MG/DL (ref 0.2–1)
BUN SERPL-MCNC: 14 MG/DL (ref 5–25)
CALCIUM SERPL-MCNC: 9.7 MG/DL (ref 8.4–10.2)
CHLORIDE SERPL-SCNC: 103 MMOL/L (ref 96–108)
CO2 SERPL-SCNC: 27 MMOL/L (ref 21–32)
CREAT SERPL-MCNC: 1 MG/DL (ref 0.6–1.3)
EOSINOPHIL # BLD AUTO: 0.1 THOUSAND/ÂΜL (ref 0–0.61)
EOSINOPHIL NFR BLD AUTO: 1 % (ref 0–6)
ERYTHROCYTE [DISTWIDTH] IN BLOOD BY AUTOMATED COUNT: 12.1 % (ref 11.6–15.1)
GFR SERPL CREATININE-BSD FRML MDRD: 78 ML/MIN/1.73SQ M
GLUCOSE SERPL-MCNC: 104 MG/DL (ref 65–140)
HCT VFR BLD AUTO: 41.8 % (ref 36.5–49.3)
HGB BLD-MCNC: 14 G/DL (ref 12–17)
IMM GRANULOCYTES # BLD AUTO: 0.03 THOUSAND/UL (ref 0–0.2)
IMM GRANULOCYTES NFR BLD AUTO: 0 % (ref 0–2)
INR PPP: 0.97 (ref 0.84–1.19)
LYMPHOCYTES # BLD AUTO: 2.54 THOUSANDS/ÂΜL (ref 0.6–4.47)
LYMPHOCYTES NFR BLD AUTO: 26 % (ref 14–44)
MCH RBC QN AUTO: 29.7 PG (ref 26.8–34.3)
MCHC RBC AUTO-ENTMCNC: 33.5 G/DL (ref 31.4–37.4)
MCV RBC AUTO: 89 FL (ref 82–98)
MONOCYTES # BLD AUTO: 0.61 THOUSAND/ÂΜL (ref 0.17–1.22)
MONOCYTES NFR BLD AUTO: 6 % (ref 4–12)
NEUTROPHILS # BLD AUTO: 6.59 THOUSANDS/ÂΜL (ref 1.85–7.62)
NEUTS SEG NFR BLD AUTO: 66 % (ref 43–75)
NRBC BLD AUTO-RTO: 0 /100 WBCS
PLATELET # BLD AUTO: 266 THOUSANDS/UL (ref 149–390)
PMV BLD AUTO: 10.9 FL (ref 8.9–12.7)
POTASSIUM SERPL-SCNC: 4 MMOL/L (ref 3.5–5.3)
PROT SERPL-MCNC: 7.4 G/DL (ref 6.4–8.4)
PROTHROMBIN TIME: 13 SECONDS (ref 11.6–14.5)
RBC # BLD AUTO: 4.71 MILLION/UL (ref 3.88–5.62)
SODIUM SERPL-SCNC: 138 MMOL/L (ref 135–147)
WBC # BLD AUTO: 9.93 THOUSAND/UL (ref 4.31–10.16)

## 2023-08-07 PROCEDURE — 96365 THER/PROPH/DIAG IV INF INIT: CPT

## 2023-08-07 PROCEDURE — 73130 X-RAY EXAM OF HAND: CPT

## 2023-08-07 PROCEDURE — G0463 HOSPITAL OUTPT CLINIC VISIT: HCPCS

## 2023-08-07 PROCEDURE — 99283 EMERGENCY DEPT VISIT LOW MDM: CPT

## 2023-08-07 PROCEDURE — 85730 THROMBOPLASTIN TIME PARTIAL: CPT | Performed by: EMERGENCY MEDICINE

## 2023-08-07 PROCEDURE — 85025 COMPLETE CBC W/AUTO DIFF WBC: CPT | Performed by: EMERGENCY MEDICINE

## 2023-08-07 PROCEDURE — 36415 COLL VENOUS BLD VENIPUNCTURE: CPT | Performed by: EMERGENCY MEDICINE

## 2023-08-07 PROCEDURE — 99213 OFFICE O/P EST LOW 20 MIN: CPT

## 2023-08-07 PROCEDURE — 85610 PROTHROMBIN TIME: CPT | Performed by: EMERGENCY MEDICINE

## 2023-08-07 PROCEDURE — 80053 COMPREHEN METABOLIC PANEL: CPT | Performed by: EMERGENCY MEDICINE

## 2023-08-07 RX ORDER — CEFAZOLIN SODIUM 2 G/50ML
2000 SOLUTION INTRAVENOUS ONCE
Status: COMPLETED | OUTPATIENT
Start: 2023-08-07 | End: 2023-08-07

## 2023-08-07 RX ORDER — OXYCODONE HYDROCHLORIDE 5 MG/1
5 TABLET ORAL EVERY 4 HOURS PRN
Qty: 7 TABLET | Refills: 0 | Status: SHIPPED | OUTPATIENT
Start: 2023-08-07

## 2023-08-07 RX ORDER — CEPHALEXIN 500 MG/1
500 CAPSULE ORAL EVERY 12 HOURS SCHEDULED
Qty: 14 CAPSULE | Refills: 0 | Status: SHIPPED | OUTPATIENT
Start: 2023-08-07 | End: 2023-08-14

## 2023-08-07 RX ADMIN — CEFAZOLIN SODIUM 2000 MG: 2 SOLUTION INTRAVENOUS at 15:11

## 2023-08-07 NOTE — DISCHARGE INSTRUCTIONS
Your x-ray showed a small nondisplaced fracture of your left thumb consistent with your injury. Please follow-up with hand surgery. Referral has been placed for you and you should be contacted within 1 to 2 days for outpatient follow-up. Please take your prescribed antibiotics as written.

## 2023-08-07 NOTE — PROGRESS NOTES
[facility]        NAME: Ebony Cosme is a 79 y.o. male  : 1955    MRN: [id number]  DATE: 2023  TIME: 11:45 AM    Assessment and Plan   Crushing injury of left thumb, initial encounter [S67.02XA]  1. Crushing injury of left thumb, initial encounter  Transfer to other facility        Patient to proceed to the emergency department for further evaluation and closure of comprehensive wound. Did speak with Dar Childress and made aware of patient's impending arrival.  Patient to go POV. Pressure dressing was applied and current time of discharge not bleeding through the pressure dressing. Concern for crushing injury to the thumb will need to see hand specialist as well as a possible open fracture. Patient Instructions       Follow up with PCP in 3-5 days. Proceed to  ER if symptoms worsen. Chief Complaint     Chief Complaint   Patient presents with   • Finger Injury     Left thumb          History of Present Illness       Patient is a 72-year-old male who presents to the office today for a crushing injury to the left thumb. He states that approximately half hour prior to arrival he was putting a 50 pound weight back onto the rack when he dropped the weight onto his left thumb. He does take a baby aspirin every day he presents with a wound and a pressure dressing that is currently soaking through with blood. Tetanus last in 2018. Denies any pain does have decreased sensation. Is right-handed. Review of Systems   Review of Systems   Skin: Positive for wound. All other systems reviewed and are negative.         Current Medications       Current Outpatient Medications:   •  aspirin (ECOTRIN LOW STRENGTH) 81 mg EC tablet, Take 81 mg by mouth daily, Disp: , Rfl:   •  benazepril (LOTENSIN) 10 mg tablet, Take 1 tablet (10 mg total) by mouth daily (Patient taking differently: Take 5 mg by mouth daily), Disp: 90 tablet, Rfl: 1  •  omeprazole (PriLOSEC) 20 mg delayed release capsule, Take 1 capsule (20 mg total) by mouth daily, Disp: 90 capsule, Rfl: 0  •  patient supplied medication, Hemp oil 500 mg BID, Disp: , Rfl:   •  valACYclovir (VALTREX) 1,000 mg tablet, Take 1 tablet (1,000 mg total) by mouth daily for 6 days, Disp: 6 tablet, Rfl: 0    Current Allergies     Allergies as of 08/07/2023   • (No Known Allergies)            The following portions of the patient's history were reviewed and updated as appropriate: allergies, current medications, past family history, past medical history, past social history, past surgical history and problem list.     Past Medical History:   Diagnosis Date   • Arthritis    • GERD (gastroesophageal reflux disease)    • Hiatal hernia    • Jungle fever 1986    SHAKES, VOMITING APPROX EVERY 6 WEEKS   • Shingles        Past Surgical History:   Procedure Laterality Date   • VASECTOMY     • WISDOM TOOTH EXTRACTION Bilateral        Family History   Problem Relation Age of Onset   • Dementia Mother    • Parkinsonism Mother    • Hepatitis Father    • Cancer Brother    • Dementia Maternal Grandmother    • Stroke Maternal Grandmother    • Cancer Paternal Aunt    • Cancer Paternal Uncle          Medications have been verified. Objective   /77   Pulse 72   Temp 97.8 °F (36.6 °C)   Resp 18   Ht 5' 11" (1.803 m)   Wt 91.2 kg (201 lb)   SpO2 99%   BMI 28.03 kg/m²        Physical Exam     Physical Exam  Vitals and nursing note reviewed. Constitutional:       Appearance: Normal appearance. He is normal weight. Cardiovascular:      Rate and Rhythm: Normal rate and regular rhythm. Pulses: Normal pulses. Heart sounds: Normal heart sounds. Pulmonary:      Effort: Pulmonary effort is normal.      Breath sounds: Normal breath sounds. Musculoskeletal:         General: Tenderness and signs of injury present. Hands:       Comments: Large clot noted in prearrival bandaging. Wet to dry sterile dressing applied. Coban applied. Neurological:      Mental Status: He is alert.

## 2023-08-07 NOTE — ED TRIAGE NOTES
Via [address] from Urgent [facility] w/complaint of avulson to left thumb tip; pt states dropped 50# weight on thumb; per pt, last TDaP "about 5 years ago"; thumb was wrapped by Urgent Care; Urgent Care did not clean out wound, just wrapped w/pressure bandage to control bleeding; denies pain

## 2023-08-07 NOTE — ED ATTENDING ATTESTATION
Final Diagnosis:  1. Avulsion fracture of left thumb           IBony MD, saw and evaluated the patient. All available labs and X-rays were ordered by me or the resident and have been reviewed by myself. I discussed the patient with the resident / non-physician and agree with the resident's / non-physician practitioner's findings and plan as documented in the resident's / non-physician practicitioner's note, except where noted. At this point, I agree with the current assessment done in the ED. I was present during key portions of all procedures performed unless otherwise stated. Chief Complaint   Patient presents with   • Thumb Injury     [address]ia [address] from Urgent [facility] w/complaint of avulson to left thumb tip; pt states dropped 50# weight on thumb; per pt, last TDaP "about 5 years ago"; thumb was wrapped by Urgent Care; Urgent Care did not clean out wound, just wrapped w/pressure bandage to control bleeding; denies pain     This is a 79 y.o. male presenting for evaluation of thumb injury. Patient states that he was exercising and he was putting down some 50 pound dumbbells in his thumb got caught between the holding apparatus and the weight. Immediate onset of pain. Then went to prompt care for evaluation. They sent him here for further evaluation for bleeding and possible fracture. Patient states that his pain is not too bad. Worse with palpation of the area. Patient takes a baby aspirin every day. No other blood thinners    PMH:   has a past medical history of Arthritis, GERD (gastroesophageal reflux disease), Hiatal hernia, Jungle fever (1986), and Shingles. PSH:   has a past surgical history that includes [place] tooth extraction (Bilateral) and Vasectomy.     Procedures     Social:  Social History     Substance and Sexual Activity   Alcohol Use Yes    Comment: social     Social History     Tobacco Use   Smoking Status Never   Smokeless Tobacco Former     Social History Substance and Sexual Activity   Drug Use Never    Comment: medical marijuana     PE:  Vitals:    08/07/23 1223   BP: (!) 183/87   BP Location: Left arm   Pulse: 63   Resp: 17   Temp: 97.6 °F (36.4 °C)   TempSrc: Tympanic   SpO2: 98%   Weight: 90.7 kg (200 lb)   Height: 5' 11" (1.803 m)       A:    Unless otherwise specified above:     General: VS reviewed  Appears in NAD     Head: Normocephalic, atraumatic     CV: No pallor noted  Lungs:   No respiratory distress     Abdomen:  Soft, non-tender, non-distended     MSK:   No obvious deformity     Skin: No obvious rash. Neuro: Awake, alert, GCS15, CN II-XII grossly intact. Speaking in full sentences. Motor grossly intact. Psychiatric/Behavioral: Appropriate mood and affect   Exam: deferred    P:  -Patient with avulsion of the distal aspect of left thumb. There is some pulsatile bleeding at this time. No other signs of injury. X-ray shows fracture. Reviewed with hand surgery. Will place dressing. Follow-up. Will cover with antibiotics and provide analgesia. - 13 point ROS was performed and all are normal unless stated in the history above. - Nursing note reviewed. Vitals reviewed. - Orders placed by myself and/or advanced practitioner / resident.    - Previous chart was reviewed  - No language barrier.   - History obtained from patient. - There are no limitations to the history obtained. Code Status: No Order  Advance Directive and Living Will:      Power of :    POLST:      Medications   ceFAZolin (ANCEF) IVPB (premix in dextrose) 2,000 mg 50 mL (2,000 mg Intravenous New Bag 8/7/23 1511)     XR hand 3+ views LEFT   ED Interpretation   No fracture or dislocation appreciated, missing soft tissue of the distal left thumb. Interpreted independently by myself. Final Result   Soft tissue swelling and deformity of the distal aspect of the thumb.       Linear sclerotic density seen projecting through the first distal phalanx, suspect nondisplaced fracture      The study was marked in EPIC for immediate notification.       Workstation performed: [id number]           Orders Placed This Encounter   Procedures   • XR hand 3+ views LEFT   • CBC and differential   • Comprehensive metabolic panel   • Protime-INR   • APTT   • Ambulatory Referral to Hand Surgery   • Insert peripheral IV     Labs Reviewed   PROTIME-INR - Normal       Result Value Ref Range Status    Protime 13.0  11.6 - 14.5 seconds Final    INR 0.97  0.84 - 1.19 Final   APTT - Normal    PTT 27  23 - 37 seconds Final    Comment: Therapeutic Heparin Range =  60-90 seconds   CBC AND DIFFERENTIAL    WBC 9.93  4.31 - 10.16 Thousand/uL Final    RBC 4.71  3.88 - 5.62 Million/uL Final    Hemoglobin 14.0  12.0 - 17.0 g/dL Final    Hematocrit 41.8  36.5 - 49.3 % Final    MCV 89  82 - 98 fL Final    MCH 29.7  26.8 - 34.3 pg Final    MCHC 33.5  31.4 - 37.4 g/dL Final    RDW 12.1  11.6 - 15.1 % Final    MPV 10.9  8.9 - 12.7 fL Final    Platelets 698  341 - 390 Thousands/uL Final    nRBC 0  /100 WBCs Final    Neutrophils Relative 66  43 - 75 % Final    Immat GRANS % 0  0 - 2 % Final    Lymphocytes Relative 26  14 - 44 % Final    Monocytes Relative 6  4 - 12 % Final    Eosinophils Relative 1  0 - 6 % Final    Basophils Relative 1  0 - 1 % Final    Neutrophils Absolute 6.59  1.85 - 7.62 Thousands/µL Final    Immature Grans Absolute 0.03  0.00 - 0.20 Thousand/uL Final    Lymphocytes Absolute 2.54  0.60 - 4.47 Thousands/µL Final    Monocytes Absolute 0.61  0.17 - 1.22 Thousand/µL Final    Eosinophils Absolute 0.10  0.00 - 0.61 Thousand/µL Final    Basophils Absolute 0.06  0.00 - 0.10 Thousands/µL Final   COMPREHENSIVE METABOLIC PANEL    Sodium 675  135 - 147 mmol/L Final    Potassium 4.0  3.5 - 5.3 mmol/L Final    Chloride 103  96 - 108 mmol/L Final    CO2 27  21 - 32 mmol/L Final    ANION GAP 8  mmol/L Final    BUN 14  5 - 25 mg/dL Final    Creatinine 1.00  0.60 - 1.30 mg/dL Final    Comment: Standardized to IDMS reference method    Glucose 104  65 - 140 mg/dL Final    Comment: If the patient is fasting, the ADA then defines impaired fasting glucose as > 100 mg/dL and diabetes as > or equal to 123 mg/dL. Calcium 9.7  8.4 - 10.2 mg/dL Final    AST 19  13 - 39 U/L Final    ALT 18  7 - 52 U/L Final    Comment: Specimen collection should occur prior to Sulfasalazine administration due to the potential for falsely depressed results. Alkaline Phosphatase 64  34 - 104 U/L Final    Total Protein 7.4  6.4 - 8.4 g/dL Final    Albumin 4.3  3.5 - 5.0 g/dL Final    Total Bilirubin 0.44  0.20 - 1.00 mg/dL Final    Comment: Use of this assay is not recommended for patients undergoing treatment with eltrombopag due to the potential for falsely elevated results. N-acetyl-p-benzoquinone imine (metabolite of Acetaminophen) will generate erroneously low results in samples for patients that have taken an overdose of Acetaminophen.     eGFR 78  ml/min/1.73sq m Final    Narrative:     [place] guidelines for Chronic Kidney Disease (CKD):   •  Stage 1 with normal or high GFR (GFR > 90 mL/min/1.73 square meters)  •  Stage 2 Mild CKD (GFR = 60-89 mL/min/1.73 square meters)  •  Stage 3A Moderate CKD (GFR = 45-59 mL/min/1.73 square meters)  •  Stage 3B Moderate CKD (GFR = 30-44 mL/min/1.73 square meters)  •  Stage 4 Severe CKD (GFR = 15-29 mL/min/1.73 square meters)  •  Stage 5 End Stage CKD (GFR <15 mL/min/1.73 square meters)  Note: GFR calculation is accurate only with a steady state creatinine     Time reflects when diagnosis was documented in both MDM as applicable and the Disposition within this note     Time User Action Codes Description Comment    8/7/2023  3:29 PM ruben Addison Ra Add [S62.502A] Avulsion fracture of left thumb       ED Disposition     ED Disposition   Discharge    Condition   Stable    Date/Time   Mon Aug 7, 2023  3:42 PM    Comment   Marina Amel discharge to home/self care. Follow-up Information    None       Patient's Medications   Discharge Prescriptions    CEPHALEXIN (KEFLEX) 500 MG CAPSULE    Take 1 capsule (500 mg total) by mouth every 12 (twelve) hours for 7 days       Start Date: 8/7/2023  End Date: 8/14/2023       Order Dose: 500 mg       Quantity: 14 capsule    Refills: 0    OXYCODONE (ROXICODONE) 5 IMMEDIATE RELEASE TABLET    Take 1 tablet (5 mg total) by mouth every 4 (four) hours as needed for severe pain for up to 7 doses Max Daily Amount: 30 mg       Start Date: 8/7/2023  End Date: --       Order Dose: 5 mg       Quantity: 7 tablet    Refills: 0       Prior to Admission Medications   Prescriptions Last Dose Informant Patient Reported? Taking?   aspirin (ECOTRIN LOW STRENGTH) 81 mg EC tablet   Yes No   Sig: Take 81 mg by mouth daily   benazepril (LOTENSIN) 10 mg tablet   No No   Sig: Take 1 tablet (10 mg total) by mouth daily   Patient taking differently: Take 5 mg by mouth daily   omeprazole (PriLOSEC) 20 mg delayed release capsule   No No   Sig: Take 1 capsule (20 mg total) by mouth daily   patient supplied medication   Yes No   Sig: Hemp oil 500 mg BID   valACYclovir (VALTREX) 1,000 mg tablet   No No   Sig: Take 1 tablet (1,000 mg total) by mouth daily for 6 days      Facility-Administered Medications: None       Portions of the record may have been created with voice recognition software. Occasional wrong word or "sound a like" substitutions may have occurred due to the inherent limitations of voice recognition software. Read the chart carefully and recognize, using context, where substitutions have occurred.     Electronically signed by:  Naif Garcia

## 2023-08-07 NOTE — ED PROVIDER NOTES
History  Chief Complaint   Patient presents with   • Thumb Injury     Via [address] from Urgent [facility] w/complaint of avulson to left thumb tip; pt states dropped 50# weight on thumb; per pt, last TDaP "about 5 years ago"; thumb was wrapped by Urgent Care; Urgent Care did not clean out wound, just wrapped w/pressure bandage to control bleeding; denies pain     (Godwin Snell) Godwin Snell is a 79 y.o. male who identifies as male    They presented to the emergency department on August 7, 2023. Patient presents with:  Thumb Injury: Via WR from Urgent [facility] w/complaint of avulson to left thumb tip; pt states dropped 50# weight on thumb; per pt, last TDaP "about 5 years ago"; thumb was wrapped by Urgent Care; Urgent Care did not clean out wound, just wrapped w/pressure bandage to control bleeding; denies pain. The patient states that he was working out at home and dropped a 50 pound weight while attempting to place it in the rack pinching his left thumb. Patient noticed immediate pain as well as bleeding from that site and went to urgent care. Patient was sent in from urgent care for evaluation to the emergency department. Patient states that he has no numbness or tingling, ran his finger under water after initially noting the bleeding and his wife applied peroxide before going to urgent care. Patient denies any weakness at this time pain in the rest of his hand, wrist pain, forearm pain, elbow pain, or any other injuries at this time. Patient believes his last tetanus was 5 years ago. Patient denies any anticoagulants, however notes that he does take a baby aspirin every day. Patient denies fever, chills, back pain, chest pain, abdominal pain, nausea, vomiting, or any other complaint at this time.       Allergies include:  No Known Allergies      Immunizations:    Immunization History  Administered            Date(s) Administered   INFLUENZA             10/28/2008     Td (adult), adsorbed  08/27/2018    Immunizations Reviewed. Prior to Admission Medications   Prescriptions Last Dose Informant Patient Reported? Taking?   aspirin (ECOTRIN LOW STRENGTH) 81 mg EC tablet   Yes No   Sig: Take 81 mg by mouth daily   benazepril (LOTENSIN) 10 mg tablet   No No   Sig: Take 1 tablet (10 mg total) by mouth daily   Patient taking differently: Take 5 mg by mouth daily   omeprazole (PriLOSEC) 20 mg delayed release capsule   No No   Sig: Take 1 capsule (20 mg total) by mouth daily   patient supplied medication   Yes No   Sig: Hemp oil 500 mg BID   valACYclovir (VALTREX) 1,000 mg tablet   No No   Sig: Take 1 tablet (1,000 mg total) by mouth daily for 6 days      Facility-Administered Medications: None       Past Medical History:   Diagnosis Date   • Arthritis    • GERD (gastroesophageal reflux disease)    • Hiatal hernia    • Jungle fever 1986    SHAKES, VOMITING APPROX EVERY 6 WEEKS   • Shingles        Past Surgical History:   Procedure Laterality Date   • VASECTOMY     • WISDOM TOOTH EXTRACTION Bilateral        Family History   Problem Relation Age of Onset   • Dementia Mother    • Parkinsonism Mother    • Hepatitis Father    • Cancer Brother    • Dementia Maternal Grandmother    • Stroke Maternal Grandmother    • Cancer Paternal Aunt    • Cancer Paternal Uncle      I have reviewed and agree with the history as documented. E-Cigarette/Vaping   • E-Cigarette Use Never User      E-Cigarette/Vaping Substances   • Nicotine No    • THC No    • CBD No    • Flavoring No    • Other No    • Unknown No      Social History     Tobacco Use   • Smoking status: Never   • Smokeless tobacco: Former   Vaping Use   • Vaping Use: Never used   Substance Use Topics   • Alcohol use: Yes     Comment: social   • Drug use: Never     Comment: medical marijuana        Review of Systems   Constitutional: Negative for chills and fever. HENT: Negative for ear pain and sore throat.     Eyes: Negative for pain and visual disturbance. Respiratory: Negative for cough and shortness of breath. Cardiovascular: Negative for chest pain and palpitations. Gastrointestinal: Negative for abdominal pain and vomiting. Genitourinary: Negative for dysuria and hematuria. Musculoskeletal: Negative for arthralgias and back pain. Crush injury and avulsion of distal left thumb   Skin: Negative for color change and rash. Neurological: Negative for seizures and syncope. All other systems reviewed and are negative. Physical Exam  ED Triage Vitals [08/07/23 1223]   Temperature Pulse Respirations Blood Pressure SpO2   97.6 °F (36.4 °C) 63 17 (!) 183/87 98 %      Temp Source Heart Rate Source Patient Position - Orthostatic VS BP Location FiO2 (%)   Tympanic Monitor Lying Left arm --      Pain Score       No Pain             Orthostatic Vital Signs  Vitals:    08/07/23 1223   BP: (!) 183/87   Pulse: 63   Patient Position - Orthostatic VS: Lying       Physical Exam  Vitals and nursing note reviewed. Constitutional:       General: He is not in acute distress. Appearance: Normal appearance. HENT:      Head: Normocephalic and atraumatic. Right Ear: External ear normal.      Left Ear: External ear normal.      Nose: Nose normal.      Mouth/Throat:      Mouth: Mucous membranes are moist.   Eyes:      Conjunctiva/sclera: Conjunctivae normal.   Cardiovascular:      Rate and Rhythm: Normal rate and regular rhythm. Pulmonary:      Effort: Pulmonary effort is normal. No respiratory distress. Breath sounds: Normal breath sounds. Abdominal:      General: Abdomen is flat. Bowel sounds are normal.      Tenderness: There is no abdominal tenderness. There is no guarding or rebound. Musculoskeletal:         General: Normal range of motion. Cervical back: Normal range of motion.       Comments: Avulsion of the distal left thumb, pulsatile bleeding controlled with pressure, sensation intact, radial pulse 2+, full range of motion of all digits, no other appreciable bony tenderness. Full range of motion of wrist elbow and shoulder. Skin:     General: Skin is warm and dry. Capillary Refill: Capillary refill takes less than 2 seconds. Neurological:      Mental Status: He is alert. Mental status is at baseline.    Psychiatric:         Mood and Affect: Mood normal.         ED Medications  Medications   ceFAZolin (ANCEF) IVPB (premix in dextrose) 2,000 mg 50 mL (2,000 mg Intravenous New Bag 8/7/23 1511)       Diagnostic Studies  Results Reviewed     Procedure Component Value Units Date/Time    Comprehensive metabolic panel [[id number]] Collected: 08/07/23 1312    Lab Status: Final result Specimen: Blood from Arm, Right Updated: 08/07/23 1409     Sodium 138 mmol/L      Potassium 4.0 mmol/L      Chloride 103 mmol/L      CO2 27 mmol/L      ANION GAP 8 mmol/L      BUN 14 mg/dL      Creatinine 1.00 mg/dL      Glucose 104 mg/dL      Calcium 9.7 mg/dL      AST 19 U/L      ALT 18 U/L      Alkaline Phosphatase 64 U/L      Total Protein 7.4 g/dL      Albumin 4.3 g/dL      Total Bilirubin 0.44 mg/dL      eGFR 78 ml/min/1.73sq m     Narrative:      [place] guidelines for Chronic Kidney Disease (CKD):   •  Stage 1 with normal or high GFR (GFR > 90 mL/min/1.73 square meters)  •  Stage 2 Mild CKD (GFR = 60-89 mL/min/1.73 square meters)  •  Stage 3A Moderate CKD (GFR = 45-59 mL/min/1.73 square meters)  •  Stage 3B Moderate CKD (GFR = 30-44 mL/min/1.73 square meters)  •  Stage 4 Severe CKD (GFR = 15-29 mL/min/1.73 square meters)  •  Stage 5 End Stage CKD (GFR <15 mL/min/1.73 square meters)  Note: GFR calculation is accurate only with a steady state creatinine    Protime-INR [[id number]]  (Normal) Collected: 08/07/23 1312    Lab Status: Final result Specimen: Blood from Arm, Right Updated: 08/07/23 1358     Protime 13.0 seconds      INR 0.97    APTT [[id number]]  (Normal) Collected: 08/07/23 1312    Lab Status: Final result Specimen: Blood from Arm, Right Updated: 08/07/23 1358     PTT 27 seconds     CBC and differential [[id number]] Collected: 08/07/23 1312    Lab Status: Final result Specimen: Blood from Arm, Right Updated: 08/07/23 1346     WBC 9.93 Thousand/uL      RBC 4.71 Million/uL      Hemoglobin 14.0 g/dL      Hematocrit 41.8 %      MCV 89 fL      MCH 29.7 pg      MCHC 33.5 g/dL      RDW 12.1 %      MPV 10.9 fL      Platelets 534 Thousands/uL      nRBC 0 /100 WBCs      Neutrophils Relative 66 %      Immat GRANS % 0 %      Lymphocytes Relative 26 %      Monocytes Relative 6 %      Eosinophils Relative 1 %      Basophils Relative 1 %      Neutrophils Absolute 6.59 Thousands/µL      Immature Grans Absolute 0.03 Thousand/uL      Lymphocytes Absolute 2.54 Thousands/µL      Monocytes Absolute 0.61 Thousand/µL      Eosinophils Absolute 0.10 Thousand/µL      Basophils Absolute 0.06 Thousands/µL                  XR hand 3+ views LEFT   ED Interpretation by Lina Cole MD (08/07 1516)   No fracture or dislocation appreciated, missing soft tissue of the distal left thumb. Interpreted independently by myself. Final Result by Joseph Black MD (08/07 1526)   Soft tissue swelling and deformity of the distal aspect of the thumb. Linear sclerotic density seen projecting through the first distal phalanx, suspect nondisplaced fracture      The study was marked in EPIC for immediate notification. Workstation performed: [id number]               Procedures  Procedures      ED Course                             SBIRT 22yo+    Flowsheet Row Most Recent Value   Initial Alcohol Screen: US AUDIT-C     1. How often do you have a drink containing alcohol? 0 Filed at: 08/07/2023 1224   2. How many drinks containing alcohol do you have on a typical day you are drinking? 0 Filed at: 08/07/2023 1224   3b. FEMALE Any Age, or MALE 65+: How often do you have 4 or more drinks on one occassion?  0 Filed at: 08/07/2023 1224   Audit-C Score 0 Filed at: 08/07/2023 1224   CRYSTAL: How many times in the past year have you. .. Used an illegal drug or used a prescription medication for non-medical reasons? Never Filed at: 08/07/2023 1224                Medical Decision Making  Patient presents with:  Thumb Injury: Via WR from Urgent [facility] w/complaint of avulson to left thumb tip; pt states dropped 50# weight on thumb; per pt, last TDaP "about 5 years ago"; thumb was wrapped by Urgent Care; Urgent Care did not clean out wound, just wrapped w/pressure bandage to control bleeding; denies pain      Patient seen and examined noted to have avulsion of the distal left thumb, pulsatile bleeding controlled with pressure, sensation intact, radial pulse 2+, full range of motion of all digits, no other appreciable bony tenderness. Full range of motion of wrist elbow and shoulder.     Temp:  (97.6 °F (36.4 °C)-97.8 °F (36.6 °C)) 97.6 °F (36.4 °C)  HR:  (63-72) 63  Resp:  (17-18) 17  BP: (167-183)/(77-87) 183/87        Due to patient's history and presentation the following laboratory evidence was collected:  Recent Results (from the past 12 hour(s))  -CBC and differential:   Collection Time: 08/07/23  1:12 PM       Result                      Value             Ref Range           WBC                         9.93              4.31 - 10.16*       RBC                         4.71              3.88 - 5.62 *       Hemoglobin                  14.0              12.0 - 17.0 *       Hematocrit                  41.8              36.5 - 49.3 %       MCV                         89                82 - 98 fL          MCH                         29.7              26.8 - 34.3 *       MCHC                        33.5              31.4 - 37.4 *       RDW                         12.1              11.6 - 15.1 %       MPV                         10.9              8.9 - 12.7 fL       Platelets                   266               149 - 390 Th*       nRBC 0                 /100 WBCs           Neutrophils Relative        66                43 - 75 %           Immat GRANS %               0                 0 - 2 %             Lymphocytes Relative        26                14 - 44 %           Monocytes Relative          6                 4 - 12 %            Eosinophils Relative        1                 0 - 6 %             Basophils Relative          1                 0 - 1 %             Neutrophils Absolute        6.59              1.85 - 7.62 *       Immature Grans Absolute     0.03              0.00 - 0.20 *       Lymphocytes Absolute        2.54              0.60 - 4.47 *       Monocytes Absolute          0.61              0.17 - 1.22 *       Eosinophils Absolute        0.10              0.00 - 0.61 *       Basophils Absolute          0.06              0.00 - 0.10 *  -Comprehensive metabolic panel:   Collection Time: 08/07/23  1:12 PM       Result                      Value             Ref Range           Sodium                      138               135 - 147 mm*       Potassium                   4.0               3.5 - 5.3 mm*       Chloride                    103               96 - 108 mmo*       CO2                         27                21 - 32 mmol*       ANION GAP                   8                 mmol/L              BUN                         14                5 - 25 mg/dL        Creatinine                  1.00              0.60 - 1.30 *       Glucose                     104               65 - 140 mg/*       Calcium                     9.7               8.4 - 10.2 m*       AST                         19                13 - 39 U/L         ALT                         18                7 - 52 U/L          Alkaline Phosphatase        64                34 - 104 U/L        Total Protein               7.4               6.4 - 8.4 g/*       Albumin                     4.3               3.5 - 5.0 g/*       Total Bilirubin             0.44              0.20 - 1.00 *       eGFR                        78                ml/min/1.73s*  -Protime-INR:   Collection Time: 08/07/23  1:12 PM       Result                      Value             Ref Range           Protime                     13.0              11.6 - 14.5 *       INR                         0.97              0.84 - 1.19    -APTT:   Collection Time: 08/07/23  1:12 PM       Result                      Value             Ref Range           PTT                         27                23 - 37 seco*    Due to patient's history and presentation the following imaging was collected:  XR hand 3+ views LEFT   ED Interpretation    No fracture or dislocation appreciated, missing soft tissue of the distal left thumb. Interpreted independently by myself. Final Result    Soft tissue swelling and deformity of the distal aspect of the thumb. Linear sclerotic density seen projecting through the first distal phalanx, suspect nondisplaced fracture        The study was marked in EPIC for immediate notification. Workstation performed: [id number]              Patient was administered:      Medication Administration - last 24 hours from 08/06/2023 1552 to   08/07/2023 1552       Date/Time Order Dose Route Action Action by     08/07/2023 1511 EDT ceFAZolin (ANCEF) IVPB (premix in dextrose) 2,000 mg   50 mL 2,000 mg Intravenous New Bag Ashu Fuentes RN     Discussed with hand surgery (Dr. Al Truong) concerning patient's presentation, recommended use of pressure dressing to control bleeding and that patient can be seen in outpatient office. Ambulatory referral to hand surgery placed. Patient given Ancef as above. Patient provided with strict return precautions. Patient appears well, is nontoxic appearing, expresses understanding and agrees with plan of care at this time. In light of this patient would benefit from outpatient management.         Patient was rx'd: Keflex, oxycodone      Avulsion fracture of left thumb: acute illness or injury  Amount and/or Complexity of Data Reviewed  Labs: ordered. Radiology: ordered and independent interpretation performed. Risk  Prescription drug management. Disposition  Final diagnoses:   Avulsion fracture of left thumb     Time reflects when diagnosis was documented in both MDM as applicable and the Disposition within this note     Time User Action Codes Description Comment    8/7/2023  3:29 PM Lillian Suero Avulsion fracture of left thumb       ED Disposition     ED Disposition   Discharge    Condition   Stable    Date/Time   Mon Aug 7, 2023  3:42 PM    Comment   Yaw English discharge to home/self care. Follow-up Information    None         Patient's Medications   Discharge Prescriptions    CEPHALEXIN (KEFLEX) 500 MG CAPSULE    Take 1 capsule (500 mg total) by mouth every 12 (twelve) hours for 7 days       Start Date: 8/7/2023  End Date: 8/14/2023       Order Dose: 500 mg       Quantity: 14 capsule    Refills: 0    OXYCODONE (ROXICODONE) 5 IMMEDIATE RELEASE TABLET    Take 1 tablet (5 mg total) by mouth every 4 (four) hours as needed for severe pain for up to 7 doses Max Daily Amount: 30 mg       Start Date: 8/7/2023  End Date: --       Order Dose: 5 mg       Quantity: 7 tablet    Refills: 0         PDMP Review     None           ED Provider  Attending physically available and evaluated Yaw English. I managed the patient along with the ED Attending.     Electronically Signed by         Ana Tarango MD  08/07/23 0283

## 2023-08-10 ENCOUNTER — OFFICE VISIT (OUTPATIENT)
Dept: OBGYN CLINIC | Facility: CLINIC | Age: 68
End: 2023-08-10
Payer: MEDICARE

## 2023-08-10 VITALS
HEIGHT: 71 IN | WEIGHT: 200 LBS | BODY MASS INDEX: 28 KG/M2 | HEART RATE: 75 BPM | DIASTOLIC BLOOD PRESSURE: 75 MMHG | SYSTOLIC BLOOD PRESSURE: 185 MMHG

## 2023-08-10 DIAGNOSIS — S62.525A CLOSED NONDISPLACED FRACTURE OF DISTAL PHALANX OF LEFT THUMB, INITIAL ENCOUNTER: Primary | ICD-10-CM

## 2023-08-10 DIAGNOSIS — S62.502A: ICD-10-CM

## 2023-08-10 PROCEDURE — 26750 TREAT FINGER FRACTURE EACH: CPT | Performed by: ORTHOPAEDIC SURGERY

## 2023-08-10 PROCEDURE — 99203 OFFICE O/P NEW LOW 30 MIN: CPT | Performed by: ORTHOPAEDIC SURGERY

## 2023-08-10 NOTE — PROGRESS NOTES
Assessment/Plan:   Diagnoses and all orders for this visit:    Avulsion fracture of left thumb  -     Ambulatory Referral to Hand Surgery    Other orders  -     Fracture / Dislocation Treatment         Reviewed physical exam and imaging with patient on today's visit. Radiographic findings demonstrate a fracture of the distal phalanx of his left thumb. The patient is missing the tip of his distal phalanx of his left thumb. He was instructed to soak his thumb in peroxide for 20 minutes 3x per day. After soaking, he should wrap his thumb using neosporin and guaze. He was instructed to keep the wound clean and dry. He will follow-up in 3 weeks for repeat x-rays of his left thumb. The patient expresses understanding and is in agreement with today's treatment plan. The patient has a crush injury of his left thumb. There is loss of pulp at the tip. Minimal bony exposure. The nail was gone. It is my medical opinion that this may heal with secondary intention. He was instructed on local wound care. Return back 3 weeks evaluation for wound check and x-rays of left thumb-3 views      Subjective:   Patient ID: Lashae Cardona  1955     HPI  Patient is a 79 y.o. male who presents for initial evaluation of his left thumb. He injured his thumb Monday, he states that his thumb was crushed between a 50lb weight and the rack. He reported to his local ED where imaging revealed a fracture of his left thumb. He states that he lost the tip of his thumb. He reports pain directly over his thumb, that he describes as throbbing.      The following portions of the patient's history were reviewed and updated as appropriate:  Past medical history, past surgical history, Family history, social history, current medications and allergies    Past Medical History:   Diagnosis Date   • Arthritis    • GERD (gastroesophageal reflux disease)    • Hiatal hernia    • Jungle fever 1986    SHAKES, VOMITING APPROX EVERY 6 WEEKS   • Shingles Past Surgical History:   Procedure Laterality Date   • VASECTOMY     • WISDOM TOOTH EXTRACTION Bilateral        Family History   Problem Relation Age of Onset   • Dementia Mother    • Parkinsonism Mother    • Hepatitis Father    • Cancer Brother    • Dementia Maternal Grandmother    • Stroke Maternal Grandmother    • Cancer Paternal Aunt    • Cancer Paternal Uncle        Social History     Socioeconomic History   • Marital status: /Civil Union     Spouse name: None   • Number of children: None   • Years of education: None   • Highest education level: None   Occupational History   • None   Tobacco Use   • Smoking status: Never   • Smokeless tobacco: Former   Vaping Use   • Vaping Use: Never used   Substance and Sexual Activity   • Alcohol use: Yes     Comment: social   • Drug use: Never   • Sexual activity: Not Currently   Other Topics Concern   • None   Social History Narrative   • None     Social Determinants of Health     Financial Resource Strain: Not on file   Food Insecurity: Not on file   Transportation Needs: Not on file   Physical Activity: Not on file   Stress: Not on file   Social Connections: Not on file   Intimate Partner Violence: Not on file   Housing Stability: Not on file         Current Outpatient Medications:   •  aspirin (ECOTRIN LOW STRENGTH) 81 mg EC tablet, Take 81 mg by mouth daily, Disp: , Rfl:   •  benazepril (LOTENSIN) 10 mg tablet, Take 1 tablet (10 mg total) by mouth daily (Patient taking differently: Take 5 mg by mouth daily), Disp: 90 tablet, Rfl: 1  •  cephalexin (KEFLEX) 500 mg capsule, Take 1 capsule (500 mg total) by mouth every 12 (twelve) hours for 7 days, Disp: 14 capsule, Rfl: 0  •  oxyCODONE (Roxicodone) 5 immediate release tablet, Take 1 tablet (5 mg total) by mouth every 4 (four) hours as needed for severe pain for up to 7 doses Max Daily Amount: 30 mg, Disp: 7 tablet, Rfl: 0  •  omeprazole (PriLOSEC) 20 mg delayed release capsule, Take 1 capsule (20 mg total) by mouth daily, Disp: 90 capsule, Rfl: 0  •  patient supplied medication, Hemp oil 500 mg BID, Disp: , Rfl:   •  valACYclovir (VALTREX) 1,000 mg tablet, Take 1 tablet (1,000 mg total) by mouth daily for 6 days, Disp: 6 tablet, Rfl: 0    No Known Allergies    Review of Systems   Constitutional: Negative for chills and fever. HENT: Negative for ear pain and sore throat. Eyes: Negative for pain and visual disturbance. Respiratory: Negative for cough and shortness of breath. Cardiovascular: Negative for chest pain and palpitations. Gastrointestinal: Negative for abdominal pain and vomiting. Genitourinary: Negative for dysuria and hematuria. Musculoskeletal: Negative for arthralgias and back pain. Skin: Negative for color change and rash. Neurological: Negative for seizures and syncope. All other systems reviewed and are negative. Objective:  BP (!) 185/75 (BP Location: Left arm, Patient Position: Sitting, Cuff Size: Standard)   Pulse 75   Ht 5' 11" (1.803 m)   Wt 90.7 kg (200 lb)   BMI 27.89 kg/m²     Ortho Exam  left Hand - Thumb-  Patient presents with tip of distal phalanx missing   Open wound, does not feel as though there is any bone protrusion  Demonstrates normal wrist, elbow, and shoulder motion  Forearm compartments are soft and supple  2+ distal radial pulse with brisk capillary refill to the fingers  Radial, median, and ulnar motor and sensory distribution intact  Sensations light to touch intact distally      Physical Exam  HENT:      Head: Normocephalic and atraumatic. Nose: Nose normal.   Eyes:      Conjunctiva/sclera: Conjunctivae normal.   Cardiovascular:      Rate and Rhythm: Normal rate. Pulmonary:      Effort: Pulmonary effort is normal.   Musculoskeletal:      Cervical back: Neck supple. Skin:     General: Skin is warm and dry. Capillary Refill: Capillary refill takes less than 2 seconds.    Neurological:      Mental Status: He is alert and oriented to person, place, and time. Psychiatric:         Mood and Affect: Mood normal.         Behavior: Behavior normal.          Diagnostic Test Review: The attending physician has personally reviewed the pertinent films in PACS and the interpretation is as follows:    X-Ray of left thumb taken on 8/7/23 were reviewed and showed Soft tissue swelling, deformity of the distal aspect of the thumb, fracture of distal aspect of thumb. Fracture / Dislocation Treatment    Date/Time: 8/10/2023 8:30 AM    Performed by: Mike Zhu DO  Authorized by: Mike Zhu DO    Patient Location:  [facility] Protocol:  Consent: Verbal consent obtained. Risks and benefits: risks, benefits and alternatives were discussed  Consent given by: patient  Time out: Immediately prior to procedure a "time out" was called to verify the correct patient, procedure, equipment, support staff and site/side marked as required.   Timeout called at: 8/10/2023 8:43 AM.  Patient understanding: patient states understanding of the procedure being performed  Site marked: the operative site was marked  Patient identity confirmed: verbally with patient      Injury location:  Finger  Location details:  Left thumb  Injury type:  Fracture  Fracture type: distal phalanx    MCP joint involved?: No    Any IP joint involved?: No    Neurovascular status: Neurovascularly intact    Distal perfusion: normal    Neurological function: normal    Range of motion: reduced    Local anesthesia used?: No    Manipulation performed?: No    Neurovascular status: Neurovascularly intact    Distal perfusion: normal    Neurological function: normal    Range of motion: unchanged    Patient tolerance:  Patient tolerated the procedure well with no immediate complications           Scribe Attestation    I,:  Jarocho Thomas am acting as a scribe while in the presence of the attending physician.:       I,:  Mike Zhu DO personally performed the services described in this documentation    as scribed in my presence.:

## 2023-08-23 ENCOUNTER — OFFICE VISIT (OUTPATIENT)
Dept: FAMILY MEDICINE CLINIC | Facility: CLINIC | Age: 68
End: 2023-08-23
Payer: MEDICARE

## 2023-08-23 VITALS
DIASTOLIC BLOOD PRESSURE: 86 MMHG | HEIGHT: 71 IN | OXYGEN SATURATION: 98 % | HEART RATE: 67 BPM | WEIGHT: 200 LBS | BODY MASS INDEX: 28 KG/M2 | SYSTOLIC BLOOD PRESSURE: 168 MMHG

## 2023-08-23 DIAGNOSIS — S62.502D: Primary | ICD-10-CM

## 2023-08-23 PROCEDURE — 99213 OFFICE O/P EST LOW 20 MIN: CPT

## 2023-08-24 NOTE — PROGRESS NOTES
Assessment/Plan:    Open avulsion fracture of left thumb with routine healing, subsequent encounter  - Patient received Tdap in 2018  - Injury is healing well  - Continue to follow with Ortho  - Advised on signs of infection, poor healing       Diagnoses and all orders for this visit:    Open avulsion fracture of left thumb with routine healing, subsequent encounter  -     Discontinue: tetanus-diphtheria-acellular pertussis (ADACEL) 5-2-15.5 LF-mcg/0.5 injection; Inject 0.5 mL into a muscle once for 1 dose          Subjective:      Patient ID: Zak Porras is a 79 y.o. male. Mr Lenny Palomo is a 75y M presenting to clinic after recent injury of his left thumb. On 8/7, the patients L thumb was crushed between a 50lb weight and the rack. He reported to the ED, XR showing a fracture of distal phalanx of L thumb. On exam, the patient is missing the tip of his distal phalanx of his left thumb, the area is open but healing well, edges of the open area are dry, there is no remaining nail. He was instructed to soak his thumb in peroxide for 20 minutes 3x per day. After soaking, he would wrap his thumb using neosporin and guaze. The patient may continue with neosporin and wrapping, the peroxide soaking can be reduced in frequency. He followed with ortho 3d after the event, and will follow up with them in about 1wk for repeat XR. We reiterated instructions to keep the wound clean and dry. Also advised on signs of infection or poor healing, when to seek medical attention. Patient does not require additional Tdap at this time, as he last received a dose in 2018. The following portions of the patient's history were reviewed and updated as appropriate: allergies, current medications, past family history, past medical history, past social history, past surgical history and problem list.    Review of Systems   Constitutional: Negative for activity change, chills, fatigue and fever.    HENT: Negative for congestion, ear pain and sore throat. Eyes: Negative for pain and visual disturbance. Respiratory: Negative for cough, chest tightness, shortness of breath and wheezing. Cardiovascular: Negative for chest pain and palpitations. Gastrointestinal: Negative for abdominal pain, constipation, diarrhea, nausea and vomiting. Genitourinary: Negative for dysuria and hematuria. Musculoskeletal: Negative for arthralgias, back pain, joint swelling, neck pain and neck stiffness. Skin: Positive for wound. Negative for color change and rash. Neurological: Negative for dizziness, seizures, syncope, weakness, light-headedness, numbness and headaches. No numbness, tingling   Psychiatric/Behavioral: Negative for agitation and confusion. The patient is not nervous/anxious. All other systems reviewed and are negative. Objective:      /86   Pulse 67   Ht 5' 11" (1.803 m)   Wt 90.7 kg (200 lb)   SpO2 98%   BMI 27.89 kg/m²          Physical Exam  Vitals and nursing note reviewed. Constitutional:       General: He is not in acute distress. Appearance: Normal appearance. He is not ill-appearing. HENT:      Head: Normocephalic and atraumatic. Right Ear: External ear normal.      Left Ear: External ear normal.      Nose: Nose normal. No congestion. Mouth/Throat:      Mouth: Mucous membranes are moist.      Pharynx: Oropharynx is clear. Eyes:      Extraocular Movements: Extraocular movements intact. Conjunctiva/sclera: Conjunctivae normal.   Cardiovascular:      Rate and Rhythm: Normal rate and regular rhythm. Pulses: Normal pulses. Heart sounds: Normal heart sounds. No murmur heard. No gallop. Pulmonary:      Effort: Pulmonary effort is normal.      Breath sounds: Normal breath sounds. No wheezing. Abdominal:      General: Abdomen is flat. Bowel sounds are normal.      Palpations: Abdomen is soft. Tenderness: There is no abdominal tenderness. There is no guarding. Musculoskeletal:         General: Signs of injury present. No swelling. Normal range of motion. Cervical back: Normal range of motion and neck supple. No rigidity. Comments: Full ROM, no appreciable tenderness along the thumb   Skin:     General: Skin is warm and dry. Capillary Refill: Capillary refill takes less than 2 seconds. Findings: Lesion present. No erythema. Comments: No erythema, drainage  One small area of red bruising, likely small vessel injury   Neurological:      General: No focal deficit present. Mental Status: He is alert and oriented to person, place, and time.    Psychiatric:         Mood and Affect: Mood normal.         Behavior: Behavior normal.

## 2023-08-25 DIAGNOSIS — I10 ESSENTIAL HYPERTENSION: ICD-10-CM

## 2023-08-25 RX ORDER — BENAZEPRIL HYDROCHLORIDE 10 MG/1
10 TABLET ORAL DAILY
Qty: 90 TABLET | Refills: 0 | Status: SHIPPED | OUTPATIENT
Start: 2023-08-25

## 2023-10-04 ENCOUNTER — APPOINTMENT (OUTPATIENT)
Dept: RADIOLOGY | Facility: CLINIC | Age: 68
End: 2023-10-04
Payer: MEDICARE

## 2023-10-04 ENCOUNTER — OFFICE VISIT (OUTPATIENT)
Dept: FAMILY MEDICINE CLINIC | Facility: CLINIC | Age: 68
End: 2023-10-04
Payer: MEDICARE

## 2023-10-04 VITALS — OXYGEN SATURATION: 98 % | HEART RATE: 60 BPM | DIASTOLIC BLOOD PRESSURE: 82 MMHG | SYSTOLIC BLOOD PRESSURE: 158 MMHG

## 2023-10-04 DIAGNOSIS — S67.02XD CRUSH INJURY TO THUMB, LEFT, SUBSEQUENT ENCOUNTER: ICD-10-CM

## 2023-10-04 DIAGNOSIS — S67.02XD CRUSH INJURY TO THUMB, LEFT, SUBSEQUENT ENCOUNTER: Primary | ICD-10-CM

## 2023-10-04 PROCEDURE — 99213 OFFICE O/P EST LOW 20 MIN: CPT | Performed by: PHYSICIAN ASSISTANT

## 2023-10-04 PROCEDURE — 73130 X-RAY EXAM OF HAND: CPT

## 2023-10-04 NOTE — PROGRESS NOTES
Assessment/Plan:    No problem-specific Assessment & Plan notes found for this encounter. Diagnoses and all orders for this visit:    Crush injury to thumb, left, subsequent encounter  -     XR hand 3+ vw left; Future        Repeat XR to confirm healing of fracture. Will call with results     Subjective:      Patient ID: Oc Montero is a 79 y.o. male presents for follow up of crush injury to left thumb in August 2023. He is concerned if it is healing properly. He has some pain / increased sensitivity when it is touched. No pain at rest. No redness, swelling, drainage. He did not have repeat xray yet to confirm healing as recommended by ortho. HPI    The following portions of the patient's history were reviewed and updated as appropriate: allergies, current medications, past family history, past medical history, past social history, past surgical history and problem list.    Review of Systems   Musculoskeletal: Negative. Negative for arthralgias and joint swelling. Objective:      /82   Pulse 60   SpO2 98%          Physical Exam  Constitutional:       Appearance: Normal appearance. He is normal weight. HENT:      Head: Normocephalic and atraumatic. Pulmonary:      Effort: Pulmonary effort is normal.   Musculoskeletal:      Left hand: Deformity and tenderness present. No swelling or bony tenderness. Normal range of motion. Normal strength. Normal sensation. Normal capillary refill. Normal pulse. Comments: No signs of infection. Wound well healed. Nail bed deformed but no necrotic tissue    Neurological:      Mental Status: He is alert. Psychiatric:         Mood and Affect: Mood normal.         Behavior: Behavior normal.         Thought Content:  Thought content normal.         Judgment: Judgment normal.

## 2023-11-23 DIAGNOSIS — I10 ESSENTIAL HYPERTENSION: ICD-10-CM

## 2023-11-23 RX ORDER — BENAZEPRIL HYDROCHLORIDE 10 MG/1
10 TABLET ORAL DAILY
Qty: 90 TABLET | Refills: 0 | Status: SHIPPED | OUTPATIENT
Start: 2023-11-23

## 2024-01-10 DIAGNOSIS — B00.9 HSV INFECTION: ICD-10-CM

## 2024-01-10 RX ORDER — VALACYCLOVIR HYDROCHLORIDE 1 G/1
1000 TABLET, FILM COATED ORAL DAILY
Qty: 6 TABLET | Refills: 0 | Status: SHIPPED | OUTPATIENT
Start: 2024-01-10 | End: 2024-01-16

## 2024-02-08 ENCOUNTER — OFFICE VISIT (OUTPATIENT)
Dept: OBGYN CLINIC | Facility: CLINIC | Age: 69
End: 2024-02-08
Payer: MEDICARE

## 2024-02-08 ENCOUNTER — APPOINTMENT (OUTPATIENT)
Dept: RADIOLOGY | Facility: MEDICAL CENTER | Age: 69
End: 2024-02-08
Payer: MEDICARE

## 2024-02-08 VITALS
SYSTOLIC BLOOD PRESSURE: 161 MMHG | WEIGHT: 200 LBS | DIASTOLIC BLOOD PRESSURE: 82 MMHG | HEART RATE: 54 BPM | BODY MASS INDEX: 28 KG/M2 | HEIGHT: 71 IN

## 2024-02-08 DIAGNOSIS — M25.551 RIGHT HIP PAIN: ICD-10-CM

## 2024-02-08 DIAGNOSIS — M25.552 LEFT HIP PAIN: ICD-10-CM

## 2024-02-08 DIAGNOSIS — M25.551 RIGHT HIP PAIN: Primary | ICD-10-CM

## 2024-02-08 PROCEDURE — 73521 X-RAY EXAM HIPS BI 2 VIEWS: CPT

## 2024-02-08 PROCEDURE — 99213 OFFICE O/P EST LOW 20 MIN: CPT | Performed by: ORTHOPAEDIC SURGERY

## 2024-02-08 NOTE — PROGRESS NOTES
Assessment/Plan:   Diagnoses and all orders for this visit:    Right hip pain  -     XR hips bilateral 2 vw w pelvis if performed; Future    Left hip pain  -     XR hips bilateral 2 vw w pelvis if performed; Future         Reviewed physical exam and imaging with patient at time of visit. 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 His physical exam was relatively benign, he states that he experiences short bouts of pain with months of relief. Continue weightbearing activities. He may take OTC NSAIDs as needed for pain and soreness. He was advised to follow-up with pain management and/or podiatry for the lower back and feet pain. He will follow-up on an as needed basis for re-evaluation. The patient expresses understanding and is in agreement with today's treatment plan.     The patient has arthritis of his bilateral hips, right greater than left.  He is relatively asymptomatic.  He understands that as arthritis progresses, he may require surgical invention.  At this point, he wants to follow-up on an as-needed basis.  If his condition changes, he would not hesitate to let us know    Subjective:   Patient ID: Chidi Davis  1955     HPI  Patient is a 68 y.o. male who presents for initial evaluation of his bilateral hips. The patient reports chronic hip pain for several years. He states that the pain is intermittent, he states that he experiences pain for several hours at a time followed by a few months of relief. The patient states that the pain is equal on each side. He denies pain with stairs and when getting in/out of his car. He states that his pain does occur at night. His pain is worse with prolonged sitting. He states that the pain is present in his groin. He describes the pain as achy in nature. He states that the pain is well-controlled by ibuprofen.     The following portions of the patient's history were reviewed and updated as appropriate:  Past medical history, past surgical  history, Family history, social history, current medications and allergies    Past Medical History:   Diagnosis Date    Arthritis     GERD (gastroesophageal reflux disease)     Hiatal hernia     Jungle fever 1986    SHAKES, VOMITING APPROX EVERY 6 WEEKS    Shingles        Past Surgical History:   Procedure Laterality Date    VASECTOMY      WISDOM TOOTH EXTRACTION Bilateral        Family History   Problem Relation Age of Onset    Dementia Mother     Parkinsonism Mother     Hepatitis Father     Cancer Brother     Dementia Maternal Grandmother     Stroke Maternal Grandmother     Cancer Paternal Aunt     Cancer Paternal Uncle        Social History     Socioeconomic History    Marital status: /Civil Union     Spouse name: None    Number of children: None    Years of education: None    Highest education level: None   Occupational History    None   Tobacco Use    Smoking status: Never    Smokeless tobacco: Former   Vaping Use    Vaping status: Never Used   Substance and Sexual Activity    Alcohol use: Yes     Comment: social    Drug use: Never    Sexual activity: Not Currently   Other Topics Concern    None   Social History Narrative    None     Social Determinants of Health     Financial Resource Strain: Not on file   Food Insecurity: Not on file   Transportation Needs: Not on file   Physical Activity: Not on file   Stress: Not on file   Social Connections: Not on file   Intimate Partner Violence: Not on file   Housing Stability: Not on file         Current Outpatient Medications:     benazepril (LOTENSIN) 10 mg tablet, take 1 tablet by mouth once daily, Disp: 90 tablet, Rfl: 0    oxyCODONE (Roxicodone) 5 immediate release tablet, Take 1 tablet (5 mg total) by mouth every 4 (four) hours as needed for severe pain for up to 7 doses Max Daily Amount: 30 mg, Disp: 7 tablet, Rfl: 0    aspirin (ECOTRIN LOW STRENGTH) 81 mg EC tablet, Take 81 mg by mouth daily, Disp: , Rfl:     valACYclovir (VALTREX) 1,000 mg tablet, Take  "1 tablet (1,000 mg total) by mouth daily for 6 days, Disp: 6 tablet, Rfl: 0    No Known Allergies    Review of Systems   Constitutional:  Negative for chills and fever.   HENT:  Negative for ear pain and sore throat.    Eyes:  Negative for pain and visual disturbance.   Respiratory:  Negative for cough and shortness of breath.    Cardiovascular:  Negative for chest pain and palpitations.   Gastrointestinal:  Negative for abdominal pain and vomiting.   Genitourinary:  Negative for dysuria and hematuria.   Musculoskeletal:  Negative for arthralgias and back pain.   Skin:  Negative for color change and rash.   Neurological:  Negative for seizures and syncope.   All other systems reviewed and are negative.       Objective:  /82   Pulse (!) 54   Ht 5' 11\" (1.803 m)   Wt 90.7 kg (200 lb)   BMI 27.89 kg/m²     Ortho Exam  bilateral Hip -  Patient presents with no anatomical deformity  Ambulates with steady gait pattern  Uses No assistive devices  No tenderness upon palpation  ROM: 15° seated IR, 30° seated ER. 100° seated hip flexion  Smooth passive circumduction   Strength: 5/5 throughout  Knee flexor and extensor mechanism intact  Ankle DF and PF mechanism intact  2+ TP and DP pulses with brisk capillary refill to the toes  Sural, saphenous, tibial, superficial and deep peroneal motor and sensory distribution intact  Sensation to light touch intact distally      Physical Exam  HENT:      Head: Normocephalic and atraumatic.      Nose: Nose normal.   Eyes:      Conjunctiva/sclera: Conjunctivae normal.   Cardiovascular:      Rate and Rhythm: Normal rate.   Pulmonary:      Effort: Pulmonary effort is normal.   Musculoskeletal:      Cervical back: Neck supple.   Skin:     General: Skin is warm and dry.      Capillary Refill: Capillary refill takes less than 2 seconds.   Neurological:      Mental Status: He is alert and oriented to person, place, and time.   Psychiatric:         Mood and Affect: Mood normal.         " Behavior: Behavior normal.          Diagnostic Test Review:  The attending physician has personally reviewed the pertinent films in PACS and the interpretation is as follows:    X-Ray of bilateral hips and pelvis taken on 2/8/2024 were reviewed and showed osteoarthritis of bilateral hips. Right is greater than left.      Procedures   None performed.     Scribe Attestation      I,:   am acting as a scribe while in the presence of the attending physician.:       I,:   personally performed the services described in this documentation    as scribed in my presence.:

## 2024-02-22 DIAGNOSIS — B00.9 HSV INFECTION: ICD-10-CM

## 2024-02-22 RX ORDER — VALACYCLOVIR HYDROCHLORIDE 1 G/1
1000 TABLET, FILM COATED ORAL DAILY
Qty: 6 TABLET | Refills: 5 | Status: SHIPPED | OUTPATIENT
Start: 2024-02-22 | End: 2024-02-28

## 2024-03-20 DIAGNOSIS — B00.9 HSV INFECTION: ICD-10-CM

## 2024-03-20 RX ORDER — VALACYCLOVIR HYDROCHLORIDE 1 G/1
1000 TABLET, FILM COATED ORAL DAILY
Qty: 6 TABLET | Refills: 2 | Status: SHIPPED | OUTPATIENT
Start: 2024-03-20 | End: 2024-03-26

## 2024-06-07 DIAGNOSIS — B00.9 HSV INFECTION: ICD-10-CM

## 2024-06-07 RX ORDER — VALACYCLOVIR HYDROCHLORIDE 1 G/1
1000 TABLET, FILM COATED ORAL DAILY
Qty: 6 TABLET | Refills: 0 | Status: SHIPPED | OUTPATIENT
Start: 2024-06-07 | End: 2024-06-13

## 2024-07-28 DIAGNOSIS — B00.9 HSV INFECTION: ICD-10-CM

## 2024-07-29 RX ORDER — VALACYCLOVIR HYDROCHLORIDE 1 G/1
1000 TABLET, FILM COATED ORAL DAILY
Qty: 6 TABLET | Refills: 0 | Status: SHIPPED | OUTPATIENT
Start: 2024-07-29 | End: 2024-08-04

## 2024-10-07 DIAGNOSIS — B00.9 HSV INFECTION: ICD-10-CM

## 2024-10-07 RX ORDER — VALACYCLOVIR HYDROCHLORIDE 1 G/1
1000 TABLET, FILM COATED ORAL DAILY
Qty: 30 TABLET | Refills: 0 | Status: SHIPPED | OUTPATIENT
Start: 2024-10-07 | End: 2024-10-13

## 2024-10-08 ENCOUNTER — OFFICE VISIT (OUTPATIENT)
Dept: FAMILY MEDICINE CLINIC | Facility: CLINIC | Age: 69
End: 2024-10-08
Payer: MEDICARE

## 2024-10-08 VITALS
WEIGHT: 210 LBS | HEIGHT: 71 IN | DIASTOLIC BLOOD PRESSURE: 72 MMHG | RESPIRATION RATE: 18 BRPM | TEMPERATURE: 98.4 F | SYSTOLIC BLOOD PRESSURE: 130 MMHG | BODY MASS INDEX: 29.4 KG/M2 | HEART RATE: 58 BPM | OXYGEN SATURATION: 97 %

## 2024-10-08 DIAGNOSIS — Z28.21 INFLUENZA VACCINATION DECLINED: ICD-10-CM

## 2024-10-08 DIAGNOSIS — K43.9 EPIGASTRIC HERNIA: Primary | ICD-10-CM

## 2024-10-08 PROCEDURE — 99213 OFFICE O/P EST LOW 20 MIN: CPT | Performed by: PHYSICIAN ASSISTANT

## 2024-10-08 NOTE — PATIENT INSTRUCTIONS
"Patient Education     Abdominal wall hernias   The Basics   Written by the doctors and editors at [facility]   What is an abdominal wall hernia? -- The internal organs and tissues in the belly are held in place by a tough outer wall of tissue called the \"abdominal wall.\" An abdominal hernia is an area in that wall that is weak or torn. Often, when there is a hernia, organs or tissues that are normally held in place by the abdominal wall bulge or stick out through the weak or torn spot.  The size of the bulge can change depending on how much pressure is put on the abdominal wall. For example, straining when coughing or having a bowel movement can make a hernia look bigger. Lying down overnight can make the hernia look smaller, or even disappear, in the morning.  There are many different kinds of abdominal wall hernias (figure 1).  What are the symptoms of abdominal wall hernias? -- Abdominal wall hernias do not always cause symptoms. When they do, they can cause some or all of these symptoms:   A bulge somewhere on the trunk of the body - This bulge can be so small that you don't even realize it's there.   Pain, especially when coughing, straining, or using nearby muscles   A pulling sensation around the bulge   Nausea or vomiting if part of the intestine is blocked in the hernia  Abdominal wall hernias can balloon out and form a sac. That sac can hold a loop of intestine or a piece of fat that should normally be tucked inside of the belly. This can be painful and even dangerous if the tissue in the hernia gets trapped and unable to slide back into the belly. When this happens, the tissue does not get enough blood, so it can become swollen or even die (figure 2).  Should I see a doctor or nurse? -- Yes. See a doctor or nurse if you have any of the symptoms of a hernia. In most cases, doctors can diagnose a hernia just by doing an exam. During the exam, the doctor might ask you to cough or bear down while pressing on " "your hernia. This might be uncomfortable, but it is necessary to find the source of the problem.  Most of the time, the contents of the hernia can be \"reduced,\" or gently pushed back into the belly. But sometimes, the hernia gets trapped and won't go back in. If that happens, the tissue that is trapped can get damaged.  If you develop severe pain around a hernia bulge or feel sick, call your doctor or surgeon right away.  How are hernias treated? -- Not all hernias need treatment right away. But many do need to be repaired with surgery.  Surgeons can repair most hernias in 1 of 2 ways. The right surgery for you depends on the size of your hernia, where on the abdominal wall it is, whether this is the first time it is getting repaired, what your general health is like, and your surgeon's experience.  The 2 types of surgery are:   Open surgery - During an open surgery, the doctor makes 1 large cut near the hernia to repair it.   Minimally invasive surgery - \"Minimally invasive\" surgery lets the doctor make smaller cuts in the skin. They insert long, thin tools through the cuts. One of the tools has a camera (called a \"laparoscope\") on the end, which sends pictures to a TV screen. The doctor can look at the screen to see inside the body. Then, they use the long tools to do the surgery. They can control the tools directly, or with the help of a robot (this is called \"robot-assisted\" surgery).  If your hernia has reduced the blood supply to a loop of intestine, your doctor might need to remove that piece of intestine. Usually, they will then sew the intestine back together.  The recovery and aftercare for each type of hernia repair is different. Your doctor or nurse can tell you what to expect after your surgery.  All topics are updated as new evidence becomes available and our peer review process is complete.  This topic retrieved from [employer] on: Feb 26, 2024.  Topic 14038 Version 10.0  Release: 32.2.4 - C32.56  © " 2024 [employer] and/or its affiliates. All rights reserved.  figure 1: Abdominal wall hernias     Abdominal wall hernias can happen in different parts of the torso:  Incisional hernias happen along incisions from surgery.  Umbilical hernias happen at the belly button.  Epigastric hernias happen in the midline above the belly button.  Spigelian hernias happen to the left or right of the midline, where 2 layers of muscle meet.  Lumbar hernias (not shown) happen at the back.  Inguinal hernias happen in the groin area.  Femoral hernias happen where the thigh joins the torso.  Graphic 09369 Version 4.0  figure 2: Intestines bulging through hernia     In some cases, a loop of intestine can poke through a hernia. Often, surgeons can push the loop of intestine back in. But if the loop gets trapped or does not get enough blood, surgeons sometimes have to remove it and reconnect the 2 ends of intestine that are left.  Graphic 35179 Version 3.0  Consumer Information Use and Disclaimer   Disclaimer: This generalized information is a limited summary of diagnosis, treatment, and/or medication information. It is not meant to be comprehensive and should be used as a tool to help the user understand and/or assess potential diagnostic and treatment options. It does NOT include all information about conditions, treatments, medications, side effects, or risks that may apply to a specific patient. It is not intended to be medical advice or a substitute for the medical advice, diagnosis, or treatment of a health care provider based on the health care provider's examination and assessment of a patient's specific and unique circumstances. Patients must speak with a health care provider for complete information about their health, medical questions, and treatment options, including any risks or benefits regarding use of medications. This information does not endorse any treatments or medications as safe, effective, or approved for  treating a specific patient. Up[employer], Inc[employer] and its affiliates disclaim any warranty or liability relating to this information or the use thereof.The use of this information is governed by the Terms of Use, available at https://www.[employer].com/en/know/clinical-effectiveness-terms. 2024© [employer]Date, Inc. and its affiliates and/or licensors. All rights reserved.  Copyright   © 2024 [employer], [employer] and/or its affiliates. All rights reserved.

## 2024-10-08 NOTE — PROGRESS NOTES
"Ambulatory Visit  Name: Chidi Davis      : 1955      MRN: [id number]  Encounter Provider: Tiff Stevens PA-C  Encounter Date: 10/8/2024   Encounter department: [facility]    Assessment & Plan  Epigastric hernia  Non tender, only produced when baring down.   Reviewed monitor vs surgical intervention. Patient would like to think about it.          Influenza vaccination declined            History of Present Illness     68 y/o male presents with concern of hernia. He states he noticed it a few months ago but changed in size over the past few days. It does not cause him any pain or discomfort. He is very physically active. No changes in Bms, nausea, vomiting.         History obtained from : patient  Review of Systems   Constitutional: Negative.    HENT: Negative.     Respiratory: Negative.     Cardiovascular: Negative.    Gastrointestinal:         Hernia     Medical History Reviewed by provider this encounter:  Tobacco  Allergies  Meds  Problems  Med Hx  Surg Hx  Fam Hx           Objective     /72 (BP Location: Left arm, Patient Position: Sitting, Cuff Size: Large)   Pulse 58   Temp 98.4 °F (36.9 °C) (Tympanic)   Resp 18   Ht 5' 11\" (1.803 m)   Wt 95.3 kg (210 lb)   SpO2 97%   BMI 29.29 kg/m²     Physical Exam  Vitals and nursing note reviewed.   Constitutional:       General: He is not in acute distress.     Appearance: He is well-developed.   HENT:      Head: Normocephalic and atraumatic.   Eyes:      Conjunctiva/sclera: Conjunctivae normal.   Cardiovascular:      Rate and Rhythm: Normal rate and regular rhythm.      Heart sounds: No murmur heard.  Pulmonary:      Effort: Pulmonary effort is normal. No respiratory distress.      Breath sounds: Normal breath sounds.   Abdominal:      Palpations: Abdomen is soft.      Tenderness: There is no abdominal tenderness.      Hernia: A hernia is present.       Musculoskeletal:         General: No swelling.      " Cervical back: Neck supple.   Skin:     General: Skin is warm and dry.      Capillary Refill: Capillary refill takes less than 2 seconds.   Neurological:      Mental Status: He is alert and oriented to person, place, and time. Mental status is at baseline.   Psychiatric:         Mood and Affect: Mood normal.       Administrative Statements   I have spent a total time of 20 minutes in caring for this patient on the day of the visit/encounter including Prognosis, Risks and benefits of tx options, Instructions for management, Patient and family education, Risk factor reductions, Impressions, Documenting in the medical record, and Obtaining or reviewing history  .

## 2024-10-08 NOTE — ASSESSMENT & PLAN NOTE
Non tender, only produced when baring down.   Reviewed monitor vs surgical intervention. Patient would like to think about it.

## 2024-11-07 ENCOUNTER — TELEPHONE (OUTPATIENT)
Dept: FAMILY MEDICINE CLINIC | Facility: CLINIC | Age: 69
End: 2024-11-07

## 2024-11-07 DIAGNOSIS — I10 ESSENTIAL HYPERTENSION: ICD-10-CM

## 2024-11-07 DIAGNOSIS — B00.9 HSV INFECTION: ICD-10-CM

## 2024-11-08 RX ORDER — VALACYCLOVIR HYDROCHLORIDE 1 G/1
1000 TABLET, FILM COATED ORAL DAILY
Qty: 30 TABLET | Refills: 0 | Status: SHIPPED | OUTPATIENT
Start: 2024-11-08 | End: 2024-11-14

## 2025-01-23 ENCOUNTER — OFFICE VISIT (OUTPATIENT)
Dept: FAMILY MEDICINE CLINIC | Facility: CLINIC | Age: 70
End: 2025-01-23
Payer: MEDICARE

## 2025-01-23 VITALS
OXYGEN SATURATION: 97 % | BODY MASS INDEX: 29.68 KG/M2 | DIASTOLIC BLOOD PRESSURE: 78 MMHG | WEIGHT: 212 LBS | HEIGHT: 71 IN | SYSTOLIC BLOOD PRESSURE: 152 MMHG | HEART RATE: 66 BPM

## 2025-01-23 DIAGNOSIS — Z12.5 SCREENING FOR MALIGNANT NEOPLASM OF PROSTATE: ICD-10-CM

## 2025-01-23 DIAGNOSIS — Z00.00 MEDICARE ANNUAL WELLNESS VISIT, SUBSEQUENT: Primary | ICD-10-CM

## 2025-01-23 PROCEDURE — G0439 PPPS, SUBSEQ VISIT: HCPCS | Performed by: PHYSICIAN ASSISTANT

## 2025-01-23 RX ORDER — IBUPROFEN 600 MG/1
600 TABLET, FILM COATED ORAL EVERY 6 HOURS PRN
COMMUNITY
Start: 2024-10-17

## 2025-01-23 RX ORDER — CHLORHEXIDINE GLUCONATE ORAL RINSE 1.2 MG/ML
SOLUTION DENTAL
COMMUNITY
Start: 2024-10-31

## 2025-01-23 NOTE — PATIENT INSTRUCTIONS
Medicare Preventive Visit Patient Instructions  Thank you for completing your Welcome to Medicare Visit or Medicare Annual Wellness Visit today. Your next wellness visit will be due in one year (1/24/2026).  The screening/preventive services that you may require over the next 5-10 years are detailed below. Some tests may not apply to you based off risk factors and/or age. Screening tests ordered at today's visit but not completed yet may show as past due. Also, please note that scanned in results may not display below.  Preventive Screenings:  Service Recommendations Previous Testing/Comments   Colorectal Cancer Screening  Colonoscopy    Fecal Occult Blood Test (FOBT)/Fecal Immunochemical Test (FIT)  Fecal DNA/Cologuard Test  Flexible Sigmoidoscopy Age: 45-75 years old   Colonoscopy: every 10 years (May be performed more frequently if at higher risk)  OR  FOBT/FIT: every 1 year  OR  Cologuard: every 3 years  OR  Sigmoidoscopy: every 5 years  Screening may be recommended earlier than age 45 if at higher risk for colorectal cancer. Also, an individualized decision between you and your healthcare provider will decide whether screening between the ages of 76-85 would be appropriate. Colonoscopy: 05/13/2022  FOBT/FIT: Not on file  Cologuard: Not on file  Sigmoidoscopy: Not on file    Screening Current     Prostate Cancer Screening Individualized decision between patient and health care provider in men between ages of 55-69   Medicare will cover every 12 months beginning on the day after your 50th birthday PSA: No results in last 5 years           Hepatitis C Screening Once for adults born between 1945 and 1965  More frequently in patients at high risk for Hepatitis C Hep C Antibody: Not on file        Diabetes Screening 1-2 times per year if you're at risk for diabetes or have pre-diabetes Fasting glucose: 105 mg/dL (6/30/2023)  A1C: 5.8 (9/17/2021)      Cholesterol Screening Once every 5 years if you don't have a lipid  disorder. May order more often based on risk factors. Lipid panel: 06/30/2023  Screening Not Indicated  History Lipid Disorder      Other Preventive Screenings Covered by Medicare:  Abdominal Aortic Aneurysm (AAA) Screening: covered once if your at risk. You're considered to be at risk if you have a family history of AAA or a male between the age of 65-75 who smoking at least 100 cigarettes in your lifetime.  Lung Cancer Screening: covers low dose CT scan once per year if you meet all of the following conditions: (1) Age 55-77; (2) No signs or symptoms of lung cancer; (3) Current smoker or have quit smoking within the last 15 years; (4) You have a tobacco smoking history of at least 20 pack years (packs per day x number of years you smoked); (5) You get a written order from a healthcare provider.  Glaucoma Screening: covered annually if you're considered high risk: (1) You have diabetes OR (2) Family history of glaucoma OR (3)  aged 50 and older OR (4)  American aged 65 and older  Osteoporosis Screening: covered every 2 years if you meet one of the following conditions: (1) Have a vertebral abnormality; (2) On glucocorticoid therapy for more than 3 months; (3) Have primary hyperparathyroidism; (4) On osteoporosis medications and need to assess response to drug therapy.  HIV Screening: covered annually if you're between the age of 15-65. Also covered annually if you are younger than 15 and older than 65 with risk factors for HIV infection. For pregnant patients, it is covered up to 3 times per pregnancy.    Immunizations:  Immunization Recommendations   Influenza Vaccine Annual influenza vaccination during flu season is recommended for all persons aged >= 6 months who do not have contraindications   Pneumococcal Vaccine   * Pneumococcal conjugate vaccine = PCV13 (Prevnar 13), PCV15 (Vaxneuvance), PCV20 (Prevnar 20)  * Pneumococcal polysaccharide vaccine = PPSV23 (Pneumovax) Adults 19-63 yo  with certain risk factors or if 65+ yo  If never received any pneumonia vaccine: recommend Prevnar 20 (PCV20)  Give PCV20 if previously received 1 dose of PCV13 or PPSV23   Hepatitis B Vaccine 3 dose series if at intermediate or high risk (ex: diabetes, end stage renal disease, liver disease)   Respiratory syncytial virus (RSV) Vaccine - COVERED BY MEDICARE PART D  * RSVPreF3 (Arexvy) CDC recommends that adults 60 years of age and older may receive a single dose of RSV vaccine using shared clinical decision-making (SCDM)   Tetanus (Td) Vaccine - COST NOT COVERED BY MEDICARE PART B Following completion of primary series, a booster dose should be given every 10 years to maintain immunity against tetanus. Td may also be given as tetanus wound prophylaxis.   Tdap Vaccine - COST NOT COVERED BY MEDICARE PART B Recommended at least once for all adults. For pregnant patients, recommended with each pregnancy.   Shingles Vaccine (Shingrix) - COST NOT COVERED BY MEDICARE PART B  2 shot series recommended in those 19 years and older who have or will have weakened immune systems or those 50 years and older     Health Maintenance Due:      Topic Date Due    Hepatitis C Screening  Never done    Colorectal Cancer Screening  05/10/2032     Immunizations Due:      Topic Date Due    DTaP,Tdap,and Td Vaccines (1 - Tdap) 08/28/2018    Pneumococcal Vaccine: 65+ Years (1 of 1 - PCV) Never done    Influenza Vaccine (1) 09/01/2024    COVID-19 Vaccine (3 - 2024-25 season) 09/01/2024     Advance Directives   What are advance directives?  Advance directives are legal documents that state your wishes and plans for medical care. These plans are made ahead of time in case you lose your ability to make decisions for yourself. Advance directives can apply to any medical decision, such as the treatments you want, and if you want to donate organs.   What are the types of advance directives?  There are many types of advance directives, and each state  has rules about how to use them. You may choose a combination of any of the following:  Living will:  This is a written record of the treatment you want. You can also choose which treatments you do not want, which to limit, and which to stop at a certain time. This includes surgery, medicine, IV fluid, and tube feedings.   Durable power of  for healthcare (DPAHC):  This is a written record that states who you want to make healthcare choices for you when you are unable to make them for yourself. This person, called a proxy, is usually a family member or a friend. You may choose more than 1 proxy.  Do not resuscitate (DNR) order:  A DNR order is used in case your heart stops beating or you stop breathing. It is a request not to have certain forms of treatment, such as CPR. A DNR order may be included in other types of advance directives.  Medical directive:  This covers the care that you want if you are in a coma, near death, or unable to make decisions for yourself. You can list the treatments you want for each condition. Treatment may include pain medicine, surgery, blood transfusions, dialysis, IV or tube feedings, and a ventilator (breathing machine).  Values history:  This document has questions about your views, beliefs, and how you feel and think about life. This information can help others choose the care that you would choose.  Why are advance directives important?  An advance directive helps you control your care. Although spoken wishes may be used, it is better to have your wishes written down. Spoken wishes can be misunderstood, or not followed. Treatments may be given even if you do not want them. An advance directive may make it easier for your family to make difficult choices about your care.   Weight Management   Why it is important to manage your weight:  Being overweight increases your risk of health conditions such as heart disease, high blood pressure, type 2 diabetes, and certain types of  cancer. It can also increase your risk for osteoarthritis, sleep apnea, and other respiratory problems. Aim for a slow, steady weight loss. Even a small amount of weight loss can lower your risk of health problems.  How to lose weight safely:  A safe and healthy way to lose weight is to eat fewer calories and get regular exercise. You can lose up about 1 pound a week by decreasing the number of calories you eat by 500 calories each day.   Healthy meal plan for weight management:  A healthy meal plan includes a variety of foods, contains fewer calories, and helps you stay healthy. A healthy meal plan includes the following:  Eat whole-grain foods more often.  A healthy meal plan should contain fiber. Fiber is the part of grains, fruits, and vegetables that is not broken down by your body. Whole-grain foods are healthy and provide extra fiber in your diet. Some examples of whole-grain foods are whole-wheat breads and pastas, oatmeal, brown rice, and bulgur.  Eat a variety of vegetables every day.  Include dark, leafy greens such as spinach, kale, mindy greens, and mustard greens. Eat yellow and orange vegetables such as carrots, sweet potatoes, and winter squash.   Eat a variety of fruits every day.  Choose fresh or canned fruit (canned in its own juice or light syrup) instead of juice. Fruit juice has very little or no fiber.  Eat low-fat dairy foods.  Drink fat-free (skim) milk or 1% milk. Eat fat-free yogurt and low-fat cottage cheese. Try low-fat cheeses such as mozzarella and other reduced-fat cheeses.  Choose meat and other protein foods that are low in fat.  Choose beans or other legumes such as split peas or lentils. Choose fish, skinless poultry (chicken or turkey), or lean cuts of red meat (beef or pork). Before you cook meat or poultry, cut off any visible fat.   Use less fat and oil.  Try baking foods instead of frying them. Add less fat, such as margarine, sour cream, regular salad dressing and  "mayonnaise to foods. Eat fewer high-fat foods. Some examples of high-fat foods include french fries, doughnuts, ice cream, and cakes.  Eat fewer sweets.  Limit foods and drinks that are high in sugar. This includes candy, cookies, regular soda, and sweetened drinks.  Exercise:  Exercise at least 30 minutes per day on most days of the week. Some examples of exercise include walking, biking, dancing, and swimming. You can also fit in more physical activity by taking the stairs instead of the elevator or parking farther away from stores. Ask your healthcare provider about the best exercise plan for you.      © Copyright [employer] 2018 Information is for End User's use only and may not be sold, redistributed or otherwise used for commercial purposes. All illustrations and images included in CareNotes® are the copyrighted property of [employer]. or [employer]    Patient Education    Patient Education     Good sleep hygiene   The Basics   Written by the doctors and editors at [facility]   What is sleep hygiene? -- \"Sleep hygiene\" is the term doctors use for habits that help you get good-quality sleep. Good-quality sleep means getting sleep that is long enough and is restful.  The things we do throughout the day and before bed can affect how well we sleep. Sometimes, we do things that might make our sleep worse without realizing it. Good sleep hygiene is about understanding how to get better sleep.  Why is sleep important? -- You need sleep to feel awake during the day and to be alert enough to do your normal activities. It's also important for keeping your body healthy. For example, sleep:   Helps you learn information and form memories - As you sleep, the brain processes and stores information and experiences from when you were awake.   Can help lower your risk of getting sick, or help you get better faster if you are sick   Helps babies and children grow - This is because the body releases more growth " "hormone during sleep than during the day.   Helps children and adults build muscles and repair cells and tissues in the body  Not getting enough sleep can have negative effects. These can include:   Feeling irritable, anxious, or depressed   Relationship problems, especially for children and teens   An increased risk of high blood pressure, heart disease, stroke, kidney disease, obesity, and type 2 diabetes  How much sleep do I need? -- It depends on your age, lifestyle, and general health. Your doctor or nurse can help you understand exactly how much sleep you or your child needs.  The general recommendations for sleep are:   Newborns (0 to 3 months) - 14 to 17 hours a day   Infants (4 to 11 months) - 12 to 15 hours a day   Toddlers (1 to 2 years) - 11 to 14 hours a day   -aged children (3 to 5 years) - 10 to 13 hours a day   School-aged children (6 to 13 years) - 9 to 11 hours a day   Teens (14 to 17 years) - 8 to 10 hours a day   Adults (26 to 64 years) - 7 to 9 hours a day   Older adults (65 years or older) - 7 to 8 hours a day  If you do not get enough sleep each night, you build up a \"sleep debt.\" For example, if a school-aged child gets 8 hours of sleep instead of 10, they have a sleep debt of 2 hours. People with a sleep debt might need more than the recommended number of hours of sleep until they catch up. Catching up on sleep can be difficult. The healthiest thing to do is to sleep the recommended number of hours each night. But if this is not possible because of your job or other responsibilities, try to catch up on sleep when you can.  Remember that the length of sleep is not the only thing that makes for good sleep. The quality of sleep is just as important as the number of hours. This is because your brain and body move through \"sleep cycles\" as you sleep. Each cycle has a different purpose. If you do not sleep deeply, or if you wake up often throughout the night, you might not get enough time " "in each sleep cycle.  How can I get better sleep? -- Having good sleep hygiene means that you:   Go to bed and get up at about the same time every day.   Have coffee, tea, and other drinks or foods with caffeine only in the morning.   Avoid eating close to bedtime - Try not to eat a large meal soon before you go to bed. It is better to eat a healthy and filling (but not too heavy) meal in the early evening. Try to avoid late-night snacking.   Avoid alcohol in the late afternoon, evening, and bedtime.   Avoid smoking, especially in the evening.   Keep your bedroom dark, cool, quiet, and free of reminders of work or other things that cause you stress.   Try to solve problems before you go to bed.   Get plenty of physical activity, but not right before bed - Exercising 4 to 6 hours before bedtime has the best impact on sleep.   Avoid looking at phones or reading devices (\"e-books\") that give off light before bed - This can make it harder to fall asleep. There is not good evidence that wearing special \"blue light-filtering\" glasses works to improve sleep.   Keep the place where you sleep quiet and dark - If needed, you can use a white noise machine or ear plugs to block out sound. Blackout curtains or an eye mask can help block out light.   Do not check the time at night - Try to keep alarm clocks, watches, or smartphones out of your line of sight. Checking the time in the middle of the night can make you feel more awake, and can make it hard to fall back asleep.   Avoid long naps if you have trouble sleeping at night, especially in the late afternoon. Short naps (about 20 minutes) can be helpful, especially if your work schedule changes day to day and you need to be alert at different times.  What if I am having trouble sleeping? -- Everyone has a bad night of sleep sometimes. But if you regularly have trouble with sleep, see your doctor or nurse. They can check to see if you have a \"sleep disorder.\" Then, they can " work with you to treat the problem.  Call for advice if you or your child:   Regularly do not get the recommended amount of sleep   Have a lot of trouble waking up in the morning   Feel tired or have trouble focusing during the day   Wake up often throughout the night and can't get back to sleep   Have a lot of trouble falling asleep at night   Have other problems related to or caused by sleep  All topics are updated as new evidence becomes available and our peer review process is complete.  This topic retrieved from [employer] on: Mar 16, 2024.  Topic 920731 Version 2.0  Release: 32.2.4 - C32.74  © 2024 UpToDate, Inc. and/or its affiliates. All rights reserved.  Consumer Information Use and Disclaimer   Disclaimer: This generalized information is a limited summary of diagnosis, treatment, and/or medication information. It is not meant to be comprehensive and should be used as a tool to help the user understand and/or assess potential diagnostic and treatment options. It does NOT include all information about conditions, treatments, medications, side effects, or risks that may apply to a specific patient. It is not intended to be medical advice or a substitute for the medical advice, diagnosis, or treatment of a health care provider based on the health care provider's examination and assessment of a patient's specific and unique circumstances. Patients must speak with a health care provider for complete information about their health, medical questions, and treatment options, including any risks or benefits regarding use of medications. This information does not endorse any treatments or medications as safe, effective, or approved for treating a specific patient. UpToDate, Inc. and its affiliates disclaim any warranty or liability relating to this information or the use thereof.The use of this information is governed by the Terms of Use, available at https://www.[employer].com/en/know/clinical-effectiveness-terms.  2024© UpToDate, Inc. and its affiliates and/or licensors. All rights reserved.  Copyright   © 2024 UpToDate, Inc. and/or its affiliates. All rights reserved.       Core Strengthening Exercises on Back or on Hands and Knees   About this topic   Your core muscles are in your chest, back, buttock, and stomach area. They are your abdominal, back, and pelvis muscles. These muscles help keep your body stable when using your arms or legs. They also help with balance and posture. There are many exercises you can do to keep these muscles strong.  If you have back problems like a compression fracture or a ruptured disc, doing some of these exercises could make your problem worse. Some of these exercises may cause lower back pain.  General   Before starting with a program, ask your doctor if you are healthy enough to do these exercises. Your doctor may have you work with a , chiropractor, or physical therapist to make a safe exercise program to meet your needs.  Strengthening Exercises   Strengthening exercises keep your muscles firm and strong. Start by repeating each exercise 2 to 3 times. Work up to doing each exercise 10 times. Try to do the exercises 2 to 3 times each day. Hold each exercise for 3 to 5 seconds. Do all exercises slowly.  Hip lifts ? Lie on your back with your knees bent and feet flat on the floor. Tighten your stomach muscles and push your heels into the floor to lift your buttocks off the floor. Relax.  Pelvic tilts ? Lie on your back with your knees bent and feet flat on the floor. Tighten your stomach muscles and press your lower back down to the floor. Relax.  Straight leg raises lying down ? Lie on your back with one leg straight. Bend your other knee so the foot is flat on the bed. Keeping your leg straight, lift the leg up to the level of your other knee. Lower it back down. Repeat with the other leg.  Knee flex lying down ? Lie on your back with both knees bent and your feet flat on the  floor. Tighten your belly muscles. Raise one leg up and back down as if you are marching in slow motion. Keep belly muscles tight while you move your leg. Switch legs. To make this exercise harder, raise both arms straight up in the air. Tighten your belly muscles. When you raise one leg up, reach the opposite arm over your head. Switch, moving the opposite arm and leg until you have done 10 repetitions on each side.  Abdominal crunches ? Lie on your back with both knees bent. Keep your feet flat on the floor. Place your hands in one of these positions. Try starting with the first position since it is the easiest. As you get better, use the other positions to make it harder.  Crunches with arms at sides.  Crunches with arms across chest.  Crunches with arms behind head. Be careful not to interlock your fingers behind your neck or head while doing crunches. This may add tension to your neck and cause strain.  Look at the ceiling. Tighten your belly muscles and lift your shoulders and upper back off the floor. Breathe out while you are doing this. Lower your shoulders to the floor. Breathe in while you are doing this. Relax your belly muscles all the way before starting another crunch.  Arm and leg lifts on hands and knees ? Start on your hands and knees. With all of these exercises, keep your back as level as possible. If you are having trouble with this, you may want to put a small object on your back such as a book. If it falls off, you are not keeping your back level enough during the exercise.  Lift one arm up to shoulder level and hold. Lower it back down. Now, lift up the other arm and hold.  Lift one leg up and kick it straight out until it is in line with your back and hold. Lower it back down. Now, lift up the other leg and hold.  Lift one arm and the OPPOSITE leg up at the same time and hold. Lower them down. Now, repeat using the other arm and leg. This is a very hard exercise. It may take time to be able  to do this.               What will the results be?   Stronger core  Better balance  More toned belly and back muscles  Easier to do daily activities  Better sports performance  Less low back pain  Helpful tips   Stay active and work out to keep your muscles strong and flexible.  Keep a healthy weight to avoid putting too much stress on your spine. Eat a healthy diet to keep your muscles healthy.  Be sure you do not hold your breath when exercising. This can raise your blood pressure. If you tend to hold your breath, try counting out loud when exercising. If any exercise bothers you, stop right away.  Try walking or cycling at an easy pace for a few minutes to warm up your muscles. Do this again after exercising.  Exercise may be slightly uncomfortable, but you should not have sharp pains. If you do get sharp pains, stop what you are doing. If the sharp pains continue, call your doctor.  Last Reviewed Date   2021-03-18  Consumer Information Use and Disclaimer   This generalized information is a limited summary of diagnosis, treatment, and/or medication information. It is not meant to be comprehensive and should be used as a tool to help the user understand and/or assess potential diagnostic and treatment options. It does NOT include all information about conditions, treatments, medications, side effects, or risks that may apply to a specific patient. It is not intended to be medical advice or a substitute for the medical advice, diagnosis, or treatment of a health care provider based on the health care provider's examination and assessment of a patient’s specific and unique circumstances. Patients must speak with a health care provider for complete information about their health, medical questions, and treatment options, including any risks or benefits regarding use of medications. This information does not endorse any treatments or medications as safe, effective, or approved for treating a specific patient.  UpToDate, Inc. and its affiliates disclaim any warranty or liability relating to this information or the use thereof. The use of this information is governed by the Terms of Use, available at https://www.[employer].com/en/know/clinical-effectiveness-terms   Copyright   Copyright © 2024 UpToDate, Inc. and its affiliates and/or licensors. All rights reserved.

## 2025-01-23 NOTE — PROGRESS NOTES
Name: Chidi Davis      : 1955      MRN: [id number]  Encounter Provider: Tiff Stevens PA-C  Encounter Date: 2025   Encounter department: [facility]  Medicare annual wellness visit, subsequent    Orders:    Albumin / creatinine urine ratio; Future    CBC and differential; Future    Comprehensive metabolic panel; Future    TSH, 3rd generation with Free T4 reflex; Future    Lipid panel; Future    PSA, Total Screen; Future    Screening for malignant neoplasm of prostate    Orders:    PSA, Total Screen; Future       Preventive health issues were discussed with patient, and age appropriate screening tests were ordered as noted in patient's After Visit Summary. Personalized health advice and appropriate referrals for health education or preventive services given if needed, as noted in patient's After Visit Summary.    History of Present Illness     HPI   Patient Care Team:  Tiff Stevens PA-C as PCP - General (Family Medicine)    Review of Systems   Constitutional: Negative.    HENT: Negative.     Respiratory: Negative.     Cardiovascular: Negative.    Gastrointestinal: Negative.    Genitourinary:  Negative for difficulty urinating, dysuria, frequency, hematuria and urgency.        Nocturia      Medical History Reviewed by provider this encounter:  Tobacco  Allergies  Meds  Problems  Med Hx  Surg Hx  Fam Hx       Annual Wellness Visit Questionnaire   Chidi is here for his Subsequent Wellness visit.     Health Risk Assessment:   Patient rates overall health as excellent. Patient feels that their physical health rating is same. Patient is very satisfied with their life. Eyesight was rated as same. Hearing was rated as same. Patient feels that their emotional and mental health rating is same. Patients states they are never, rarely angry. Patient states they are never, rarely unusually tired/fatigued. Pain experienced in the last 7 days has been  none. Patient states that he has experienced no weight loss or gain in last 6 months.     Fall Risk Screening:   In the past year, patient has experienced: no history of falling in past year      Home Safety:  Patient does not have trouble with stairs inside or outside of their home. Patient has working smoke alarms and has working carbon monoxide detector. Home safety hazards include: none.     Nutrition:   Current diet is Regular.     Medications:   Patient is not currently taking any over-the-counter supplements. Patient is able to manage medications.     Activities of Daily Living (ADLs)/Instrumental Activities of Daily Living (IADLs):   Walk and transfer into and out of bed and chair?: Yes  Dress and groom yourself?: Yes    Bathe or shower yourself?: Yes    Feed yourself? Yes  Do your laundry/housekeeping?: Yes  Manage your money, pay your bills and track your expenses?: Yes  Make your own meals?: Yes    Do your own shopping?: Yes    Previous Hospitalizations:   Any hospitalizations or ED visits within the last 12 months?: No      Advance Care Planning:   Living will: Yes    Durable POA for healthcare: Yes    Advanced directive: Yes      Cognitive Screening:   Provider or family/friend/caregiver concerned regarding cognition?: No    PREVENTIVE SCREENINGS      Cardiovascular Screening:    General: History Lipid Disorder    Due for: Lipid Panel      Diabetes Screening:     General: Risks and Benefits Discussed    Due for: Blood Glucose      Colorectal Cancer Screening:     General: Screening Current      Prostate Cancer Screening:    General: Risks and Benefits Discussed      Osteoporosis Screening:    General: Screening Not Indicated      Abdominal Aortic Aneurysm (AAA) Screening:    Risk factors include: age between 65-76 yo        General: Screening Not Indicated      Lung Cancer Screening:     General: Screening Not Indicated      Hepatitis C Screening:    General: Screening Not Indicated    Screening, Brief  "Intervention, and Referral to Treatment (SBIRT)    Screening  Typical number of drinks in a day: 0  Typical number of drinks in a week: 1  Interpretation: Low risk drinking behavior.    AUDIT-C Screenin) How often did you have a drink containing alcohol in the past year? 2 to 4 times a month  2) How many drinks did you have on a typical day when you were drinking in the past year? 0  3) How often did you have 6 or more drinks on one occasion in the past year? never    AUDIT-C Score: 2  Interpretation: Score 0-3 (male): Negative screen for alcohol misuse    Single Item Drug Screening:  How often have you used an illegal drug (including marijuana) or a prescription medication for non-medical reasons in the past year? never    Single Item Drug Screen Score: 0  Interpretation: Negative screen for possible drug use disorder    Other Counseling Topics:   Car/seat belt/driving safety, skin self-exam, sunscreen and calcium and vitamin D intake and regular weightbearing exercise.     Social Drivers of Health      Received from [employer]     No results found.    Objective   /78   Pulse 66   Ht 5' 11\" (1.803 m)   Wt 96.2 kg (212 lb)   SpO2 97%   BMI 29.57 kg/m²     Physical Exam  Vitals and nursing note reviewed.   Constitutional:       General: He is not in acute distress.     Appearance: Normal appearance. He is well-developed.   HENT:      Head: Normocephalic and atraumatic.   Eyes:      Conjunctiva/sclera: Conjunctivae normal.   Cardiovascular:      Rate and Rhythm: Normal rate and regular rhythm.      Heart sounds: No murmur heard.  Pulmonary:      Effort: Pulmonary effort is normal. No respiratory distress.      Breath sounds: Normal breath sounds.   Abdominal:      Palpations: Abdomen is soft.      Tenderness: There is no abdominal tenderness.   Musculoskeletal:         General: No swelling.      Cervical back: Neck supple.      Right lower leg: No edema.      Left lower leg: No edema. "   Skin:     General: Skin is warm and dry.      Capillary Refill: Capillary refill takes less than 2 seconds.   Neurological:      Mental Status: He is alert and oriented to person, place, and time. Mental status is at baseline.   Psychiatric:         Mood and Affect: Mood normal.         Behavior: Behavior normal.         Thought Content: Thought content normal.         Judgment: Judgment normal.       Administrative Statements   I have spent a total time of 35 minutes in caring for this patient on the day of the visit/encounter including Diagnostic results, Prognosis, Risks and benefits of tx options, Instructions for management, Patient and family education, Importance of tx compliance, Risk factor reductions, Impressions, Counseling / Coordination of care, Documenting in the medical record, Reviewing / ordering tests, medicine, procedures  , and Obtaining or reviewing history  .

## 2025-01-28 ENCOUNTER — APPOINTMENT (OUTPATIENT)
Dept: LAB | Facility: CLINIC | Age: 70
End: 2025-01-28
Payer: MEDICARE

## 2025-01-28 DIAGNOSIS — Z00.00 MEDICARE ANNUAL WELLNESS VISIT, SUBSEQUENT: ICD-10-CM

## 2025-01-28 DIAGNOSIS — Z12.5 SCREENING FOR MALIGNANT NEOPLASM OF PROSTATE: ICD-10-CM

## 2025-01-28 LAB
ALBUMIN SERPL BCG-MCNC: 4.3 G/DL (ref 3.5–5)
ALP SERPL-CCNC: 67 U/L (ref 34–104)
ALT SERPL W P-5'-P-CCNC: 21 U/L (ref 7–52)
ANION GAP SERPL CALCULATED.3IONS-SCNC: 6 MMOL/L (ref 4–13)
AST SERPL W P-5'-P-CCNC: 22 U/L (ref 13–39)
BASOPHILS # BLD AUTO: 0.07 THOUSANDS/ΜL (ref 0–0.1)
BASOPHILS NFR BLD AUTO: 1 % (ref 0–1)
BILIRUB SERPL-MCNC: 0.79 MG/DL (ref 0.2–1)
BUN SERPL-MCNC: 15 MG/DL (ref 5–25)
CALCIUM SERPL-MCNC: 9.6 MG/DL (ref 8.4–10.2)
CHLORIDE SERPL-SCNC: 103 MMOL/L (ref 96–108)
CHOLEST SERPL-MCNC: 287 MG/DL (ref ?–200)
CO2 SERPL-SCNC: 31 MMOL/L (ref 21–32)
CREAT SERPL-MCNC: 1 MG/DL (ref 0.6–1.3)
EOSINOPHIL # BLD AUTO: 0.23 THOUSAND/ΜL (ref 0–0.61)
EOSINOPHIL NFR BLD AUTO: 4 % (ref 0–6)
ERYTHROCYTE [DISTWIDTH] IN BLOOD BY AUTOMATED COUNT: 12.4 % (ref 11.6–15.1)
GFR SERPL CREATININE-BSD FRML MDRD: 77 ML/MIN/1.73SQ M
GLUCOSE P FAST SERPL-MCNC: 114 MG/DL (ref 65–99)
HCT VFR BLD AUTO: 43.3 % (ref 36.5–49.3)
HDLC SERPL-MCNC: 43 MG/DL
HGB BLD-MCNC: 14.4 G/DL (ref 12–17)
IMM GRANULOCYTES # BLD AUTO: 0.02 THOUSAND/UL (ref 0–0.2)
IMM GRANULOCYTES NFR BLD AUTO: 0 % (ref 0–2)
LDLC SERPL CALC-MCNC: 204 MG/DL (ref 0–100)
LYMPHOCYTES # BLD AUTO: 2.44 THOUSANDS/ΜL (ref 0.6–4.47)
LYMPHOCYTES NFR BLD AUTO: 44 % (ref 14–44)
MCH RBC QN AUTO: 28.9 PG (ref 26.8–34.3)
MCHC RBC AUTO-ENTMCNC: 33.3 G/DL (ref 31.4–37.4)
MCV RBC AUTO: 87 FL (ref 82–98)
MONOCYTES # BLD AUTO: 0.5 THOUSAND/ΜL (ref 0.17–1.22)
MONOCYTES NFR BLD AUTO: 9 % (ref 4–12)
NEUTROPHILS # BLD AUTO: 2.38 THOUSANDS/ΜL (ref 1.85–7.62)
NEUTS SEG NFR BLD AUTO: 42 % (ref 43–75)
NONHDLC SERPL-MCNC: 244 MG/DL
NRBC BLD AUTO-RTO: 0 /100 WBCS
PLATELET # BLD AUTO: 299 THOUSANDS/UL (ref 149–390)
PMV BLD AUTO: 11.3 FL (ref 8.9–12.7)
POTASSIUM SERPL-SCNC: 4.8 MMOL/L (ref 3.5–5.3)
PROT SERPL-MCNC: 6.9 G/DL (ref 6.4–8.4)
PSA SERPL-MCNC: 0.4 NG/ML (ref 0–4)
RBC # BLD AUTO: 4.99 MILLION/UL (ref 3.88–5.62)
SODIUM SERPL-SCNC: 140 MMOL/L (ref 135–147)
TRIGL SERPL-MCNC: 199 MG/DL (ref ?–150)
TSH SERPL DL<=0.05 MIU/L-ACNC: 2.55 UIU/ML (ref 0.45–4.5)
WBC # BLD AUTO: 5.64 THOUSAND/UL (ref 4.31–10.16)

## 2025-01-28 PROCEDURE — 80053 COMPREHEN METABOLIC PANEL: CPT

## 2025-01-28 PROCEDURE — 85025 COMPLETE CBC W/AUTO DIFF WBC: CPT

## 2025-01-28 PROCEDURE — 80061 LIPID PANEL: CPT

## 2025-01-28 PROCEDURE — 36415 COLL VENOUS BLD VENIPUNCTURE: CPT

## 2025-01-28 PROCEDURE — 84443 ASSAY THYROID STIM HORMONE: CPT

## 2025-01-28 PROCEDURE — G0103 PSA SCREENING: HCPCS

## 2025-01-31 ENCOUNTER — RESULTS FOLLOW-UP (OUTPATIENT)
Dept: FAMILY MEDICINE CLINIC | Facility: CLINIC | Age: 70
End: 2025-01-31